# Patient Record
Sex: FEMALE | Race: WHITE | NOT HISPANIC OR LATINO
[De-identification: names, ages, dates, MRNs, and addresses within clinical notes are randomized per-mention and may not be internally consistent; named-entity substitution may affect disease eponyms.]

---

## 2017-01-28 ENCOUNTER — APPOINTMENT (OUTPATIENT)
Dept: FAMILY MEDICINE | Facility: CLINIC | Age: 62
End: 2017-01-28

## 2017-01-28 VITALS
WEIGHT: 195 LBS | TEMPERATURE: 99.8 F | SYSTOLIC BLOOD PRESSURE: 120 MMHG | DIASTOLIC BLOOD PRESSURE: 80 MMHG | BODY MASS INDEX: 31.34 KG/M2 | HEIGHT: 66 IN

## 2017-01-28 VITALS — DIASTOLIC BLOOD PRESSURE: 80 MMHG | HEART RATE: 82 BPM | RESPIRATION RATE: 16 BRPM | SYSTOLIC BLOOD PRESSURE: 120 MMHG

## 2017-03-01 ENCOUNTER — RX RENEWAL (OUTPATIENT)
Age: 62
End: 2017-03-01

## 2017-03-16 ENCOUNTER — RX RENEWAL (OUTPATIENT)
Age: 62
End: 2017-03-16

## 2017-04-03 ENCOUNTER — APPOINTMENT (OUTPATIENT)
Dept: FAMILY MEDICINE | Facility: CLINIC | Age: 62
End: 2017-04-03

## 2017-04-03 VITALS — SYSTOLIC BLOOD PRESSURE: 120 MMHG | DIASTOLIC BLOOD PRESSURE: 90 MMHG | HEART RATE: 72 BPM | RESPIRATION RATE: 16 BRPM

## 2017-04-03 VITALS
WEIGHT: 193.31 LBS | BODY MASS INDEX: 31.07 KG/M2 | OXYGEN SATURATION: 97 % | HEART RATE: 76 BPM | HEIGHT: 66 IN | SYSTOLIC BLOOD PRESSURE: 120 MMHG | DIASTOLIC BLOOD PRESSURE: 84 MMHG

## 2017-04-03 RX ORDER — SUCRALFATE 1 G/10ML
1 SUSPENSION ORAL
Qty: 900 | Refills: 0 | Status: COMPLETED | COMMUNITY
Start: 2016-10-18 | End: 2017-04-03

## 2017-04-03 RX ORDER — OSELTAMIVIR PHOSPHATE 75 MG/1
75 CAPSULE ORAL TWICE DAILY
Qty: 10 | Refills: 0 | Status: COMPLETED | COMMUNITY
Start: 2017-01-28 | End: 2017-04-03

## 2017-04-04 ENCOUNTER — TRANSCRIPTION ENCOUNTER (OUTPATIENT)
Age: 62
End: 2017-04-04

## 2017-06-01 ENCOUNTER — RX RENEWAL (OUTPATIENT)
Age: 62
End: 2017-06-01

## 2017-06-10 ENCOUNTER — OUTPATIENT (OUTPATIENT)
Dept: OUTPATIENT SERVICES | Facility: HOSPITAL | Age: 62
LOS: 1 days | End: 2017-06-10
Payer: COMMERCIAL

## 2017-06-10 DIAGNOSIS — G47.33 OBSTRUCTIVE SLEEP APNEA (ADULT) (PEDIATRIC): ICD-10-CM

## 2017-06-10 PROCEDURE — 95810 POLYSOM 6/> YRS 4/> PARAM: CPT | Mod: 26

## 2017-06-10 PROCEDURE — 95810 POLYSOM 6/> YRS 4/> PARAM: CPT

## 2017-06-22 ENCOUNTER — RX RENEWAL (OUTPATIENT)
Age: 62
End: 2017-06-22

## 2017-06-26 ENCOUNTER — APPOINTMENT (OUTPATIENT)
Dept: FAMILY MEDICINE | Facility: CLINIC | Age: 62
End: 2017-06-26

## 2017-06-26 ENCOUNTER — LABORATORY RESULT (OUTPATIENT)
Age: 62
End: 2017-06-26

## 2017-06-26 VITALS — SYSTOLIC BLOOD PRESSURE: 120 MMHG | RESPIRATION RATE: 16 BRPM | DIASTOLIC BLOOD PRESSURE: 76 MMHG | HEART RATE: 72 BPM

## 2017-06-26 VITALS
BODY MASS INDEX: 31.82 KG/M2 | OXYGEN SATURATION: 97 % | RESPIRATION RATE: 16 BRPM | SYSTOLIC BLOOD PRESSURE: 110 MMHG | HEART RATE: 75 BPM | WEIGHT: 198 LBS | DIASTOLIC BLOOD PRESSURE: 70 MMHG | HEIGHT: 66 IN

## 2017-06-26 RX ORDER — MOMETASONE 50 UG/1
50 SPRAY, METERED NASAL DAILY
Qty: 3 | Refills: 3 | Status: COMPLETED | COMMUNITY
Start: 2017-04-03 | End: 2017-06-26

## 2017-06-26 RX ORDER — CETIRIZINE HYDROCHLORIDE 10 MG/1
10 TABLET, FILM COATED ORAL
Qty: 90 | Refills: 3 | Status: COMPLETED | COMMUNITY
Start: 2017-04-03 | End: 2017-06-26

## 2017-06-27 LAB
B BURGDOR IGG+IGM SER QL IB: NORMAL
BASOPHILS # BLD AUTO: 0.04 K/UL
BASOPHILS NFR BLD AUTO: 0.7 %
C3 SERPL-MCNC: 140 MG/DL
CCP AB SER IA-ACNC: <8 UNITS
EOSINOPHIL # BLD AUTO: 0.11 K/UL
EOSINOPHIL NFR BLD AUTO: 1.9 %
ERYTHROCYTE [SEDIMENTATION RATE] IN BLOOD BY WESTERGREN METHOD: 9 MM/HR
HCT VFR BLD CALC: 43.9 %
HGB BLD-MCNC: 14 G/DL
IMM GRANULOCYTES NFR BLD AUTO: 0.4 %
LYMPHOCYTES # BLD AUTO: 1.48 K/UL
LYMPHOCYTES NFR BLD AUTO: 26.1 %
MAN DIFF?: NORMAL
MCHC RBC-ENTMCNC: 30.4 PG
MCHC RBC-ENTMCNC: 31.9 GM/DL
MCV RBC AUTO: 95.4 FL
MONOCYTES # BLD AUTO: 0.5 K/UL
MONOCYTES NFR BLD AUTO: 8.8 %
NEUTROPHILS # BLD AUTO: 3.52 K/UL
NEUTROPHILS NFR BLD AUTO: 62.1 %
PLATELET # BLD AUTO: 263 K/UL
RBC # BLD: 4.6 M/UL
RBC # FLD: 13.3 %
RF+CCP IGG SER-IMP: NEGATIVE
RHEUMATOID FACT SER QL: <7 IU/ML
WBC # FLD AUTO: 5.67 K/UL

## 2017-06-28 LAB
ANA PAT FLD IF-IMP: ABNORMAL
ANA SER IF-ACNC: ABNORMAL

## 2017-07-06 ENCOUNTER — RX RENEWAL (OUTPATIENT)
Age: 62
End: 2017-07-06

## 2017-07-11 ENCOUNTER — TRANSCRIPTION ENCOUNTER (OUTPATIENT)
Age: 62
End: 2017-07-11

## 2017-07-11 LAB — HEMOCCULT STL QL IA: NEGATIVE

## 2017-07-19 ENCOUNTER — APPOINTMENT (OUTPATIENT)
Dept: PULMONOLOGY | Facility: CLINIC | Age: 62
End: 2017-07-19

## 2017-07-19 VITALS
SYSTOLIC BLOOD PRESSURE: 110 MMHG | WEIGHT: 197 LBS | DIASTOLIC BLOOD PRESSURE: 70 MMHG | OXYGEN SATURATION: 96 % | BODY MASS INDEX: 31.8 KG/M2 | HEART RATE: 92 BPM

## 2017-07-19 RX ORDER — DEXLANSOPRAZOLE 60 MG/1
60 CAPSULE, DELAYED RELEASE ORAL DAILY
Refills: 0 | Status: DISCONTINUED | COMMUNITY
End: 2017-07-19

## 2017-09-05 ENCOUNTER — FORM ENCOUNTER (OUTPATIENT)
Age: 62
End: 2017-09-05

## 2017-09-05 ENCOUNTER — APPOINTMENT (OUTPATIENT)
Dept: FAMILY MEDICINE | Facility: CLINIC | Age: 62
End: 2017-09-05
Payer: COMMERCIAL

## 2017-09-05 VITALS
WEIGHT: 193 LBS | SYSTOLIC BLOOD PRESSURE: 132 MMHG | DIASTOLIC BLOOD PRESSURE: 86 MMHG | BODY MASS INDEX: 31.02 KG/M2 | HEIGHT: 66 IN

## 2017-09-05 DIAGNOSIS — S49.90XA UNSPECIFIED INJURY OF SHOULDER AND UPPER ARM, UNSPECIFIED ARM, INITIAL ENCOUNTER: ICD-10-CM

## 2017-09-05 PROCEDURE — 99213 OFFICE O/P EST LOW 20 MIN: CPT

## 2017-09-06 ENCOUNTER — OUTPATIENT (OUTPATIENT)
Dept: OUTPATIENT SERVICES | Facility: HOSPITAL | Age: 62
LOS: 1 days | End: 2017-09-06
Payer: COMMERCIAL

## 2017-09-06 ENCOUNTER — APPOINTMENT (OUTPATIENT)
Dept: MRI IMAGING | Facility: CLINIC | Age: 62
End: 2017-09-06
Payer: COMMERCIAL

## 2017-09-06 DIAGNOSIS — S49.90XA UNSPECIFIED INJURY OF SHOULDER AND UPPER ARM, UNSPECIFIED ARM, INITIAL ENCOUNTER: ICD-10-CM

## 2017-09-06 DIAGNOSIS — Z00.8 ENCOUNTER FOR OTHER GENERAL EXAMINATION: ICD-10-CM

## 2017-09-06 PROCEDURE — 73221 MRI JOINT UPR EXTREM W/O DYE: CPT

## 2017-09-06 PROCEDURE — 73221 MRI JOINT UPR EXTREM W/O DYE: CPT | Mod: 26,LT

## 2017-09-11 ENCOUNTER — MOBILE ON CALL (OUTPATIENT)
Age: 62
End: 2017-09-11

## 2017-09-13 ENCOUNTER — APPOINTMENT (OUTPATIENT)
Dept: PULMONOLOGY | Facility: CLINIC | Age: 62
End: 2017-09-13

## 2017-10-09 ENCOUNTER — APPOINTMENT (OUTPATIENT)
Dept: PULMONOLOGY | Facility: CLINIC | Age: 62
End: 2017-10-09

## 2017-10-09 VITALS
DIASTOLIC BLOOD PRESSURE: 68 MMHG | HEART RATE: 88 BPM | OXYGEN SATURATION: 95 % | BODY MASS INDEX: 31.96 KG/M2 | SYSTOLIC BLOOD PRESSURE: 100 MMHG | WEIGHT: 198 LBS | RESPIRATION RATE: 16 BRPM

## 2017-11-17 ENCOUNTER — APPOINTMENT (OUTPATIENT)
Dept: FAMILY MEDICINE | Facility: CLINIC | Age: 62
End: 2017-11-17
Payer: OTHER MISCELLANEOUS

## 2017-11-17 VITALS
RESPIRATION RATE: 16 BRPM | WEIGHT: 198 LBS | HEIGHT: 66 IN | OXYGEN SATURATION: 98 % | DIASTOLIC BLOOD PRESSURE: 68 MMHG | SYSTOLIC BLOOD PRESSURE: 110 MMHG | HEART RATE: 77 BPM | BODY MASS INDEX: 31.82 KG/M2

## 2017-11-17 PROCEDURE — 99203 OFFICE O/P NEW LOW 30 MIN: CPT

## 2017-11-17 RX ORDER — BENZONATATE 100 MG/1
100 CAPSULE ORAL
Qty: 30 | Refills: 0 | Status: DISCONTINUED | COMMUNITY
Start: 2017-10-26

## 2017-11-17 RX ORDER — FAMOTIDINE 20 MG/1
20 TABLET, FILM COATED ORAL
Refills: 0 | Status: DISCONTINUED | COMMUNITY
End: 2017-11-17

## 2017-11-17 RX ORDER — DOXYCYCLINE 100 MG/1
100 CAPSULE ORAL
Qty: 20 | Refills: 0 | Status: DISCONTINUED | COMMUNITY
Start: 2017-10-26

## 2017-11-17 RX ORDER — AZELASTINE HYDROCHLORIDE 137 UG/1
0.1 SPRAY, METERED NASAL
Qty: 30 | Refills: 0 | Status: DISCONTINUED | COMMUNITY
Start: 2017-10-26

## 2017-12-08 ENCOUNTER — APPOINTMENT (OUTPATIENT)
Dept: PULMONOLOGY | Facility: CLINIC | Age: 62
End: 2017-12-08

## 2017-12-22 ENCOUNTER — RESULT REVIEW (OUTPATIENT)
Age: 62
End: 2017-12-22

## 2017-12-31 ENCOUNTER — RX RENEWAL (OUTPATIENT)
Age: 62
End: 2017-12-31

## 2018-02-13 ENCOUNTER — RESULT CHARGE (OUTPATIENT)
Age: 63
End: 2018-02-13

## 2018-02-13 ENCOUNTER — APPOINTMENT (OUTPATIENT)
Dept: FAMILY MEDICINE | Facility: CLINIC | Age: 63
End: 2018-02-13
Payer: COMMERCIAL

## 2018-02-13 VITALS
SYSTOLIC BLOOD PRESSURE: 124 MMHG | BODY MASS INDEX: 31.82 KG/M2 | OXYGEN SATURATION: 98 % | HEIGHT: 66 IN | WEIGHT: 198 LBS | TEMPERATURE: 99.2 F | HEART RATE: 72 BPM | DIASTOLIC BLOOD PRESSURE: 70 MMHG | RESPIRATION RATE: 16 BRPM

## 2018-02-13 LAB — S PYO AG SPEC QL IA: NEGATIVE

## 2018-02-13 PROCEDURE — 99214 OFFICE O/P EST MOD 30 MIN: CPT | Mod: 25

## 2018-02-13 PROCEDURE — 87880 STREP A ASSAY W/OPTIC: CPT | Mod: QW

## 2018-03-07 ENCOUNTER — APPOINTMENT (OUTPATIENT)
Dept: PULMONOLOGY | Facility: CLINIC | Age: 63
End: 2018-03-07

## 2018-03-09 ENCOUNTER — APPOINTMENT (OUTPATIENT)
Dept: PULMONOLOGY | Facility: CLINIC | Age: 63
End: 2018-03-09

## 2018-06-06 ENCOUNTER — RX RENEWAL (OUTPATIENT)
Age: 63
End: 2018-06-06

## 2018-06-12 ENCOUNTER — APPOINTMENT (OUTPATIENT)
Dept: PULMONOLOGY | Facility: CLINIC | Age: 63
End: 2018-06-12
Payer: COMMERCIAL

## 2018-06-12 ENCOUNTER — RX RENEWAL (OUTPATIENT)
Age: 63
End: 2018-06-12

## 2018-06-12 VITALS
OXYGEN SATURATION: 94 % | HEART RATE: 72 BPM | WEIGHT: 191 LBS | BODY MASS INDEX: 30.7 KG/M2 | SYSTOLIC BLOOD PRESSURE: 112 MMHG | DIASTOLIC BLOOD PRESSURE: 60 MMHG | HEIGHT: 66 IN

## 2018-06-12 PROCEDURE — 99214 OFFICE O/P EST MOD 30 MIN: CPT

## 2018-06-12 RX ORDER — OSELTAMIVIR PHOSPHATE 75 MG/1
75 CAPSULE ORAL TWICE DAILY
Qty: 10 | Refills: 0 | Status: DISCONTINUED | COMMUNITY
Start: 2018-02-13 | End: 2018-06-12

## 2018-06-12 RX ORDER — NAPROXEN 500 MG/1
500 TABLET ORAL
Qty: 60 | Refills: 0 | Status: DISCONTINUED | COMMUNITY
Start: 2017-09-05 | End: 2018-06-12

## 2018-06-12 RX ORDER — OXYCODONE AND ACETAMINOPHEN 5; 325 MG/1; MG/1
5-325 TABLET ORAL
Qty: 30 | Refills: 0 | Status: DISCONTINUED | COMMUNITY
Start: 2017-09-05 | End: 2018-06-12

## 2018-06-12 RX ORDER — AZITHROMYCIN 250 MG/1
250 TABLET, FILM COATED ORAL
Qty: 1 | Refills: 0 | Status: DISCONTINUED | COMMUNITY
Start: 2018-02-13 | End: 2018-06-12

## 2018-07-06 ENCOUNTER — RX RENEWAL (OUTPATIENT)
Age: 63
End: 2018-07-06

## 2018-07-13 ENCOUNTER — RX RENEWAL (OUTPATIENT)
Age: 63
End: 2018-07-13

## 2018-08-01 ENCOUNTER — APPOINTMENT (OUTPATIENT)
Dept: FAMILY MEDICINE | Facility: CLINIC | Age: 63
End: 2018-08-01
Payer: COMMERCIAL

## 2018-08-01 VITALS
HEIGHT: 66 IN | SYSTOLIC BLOOD PRESSURE: 110 MMHG | OXYGEN SATURATION: 97 % | DIASTOLIC BLOOD PRESSURE: 72 MMHG | HEART RATE: 79 BPM | BODY MASS INDEX: 30.22 KG/M2 | RESPIRATION RATE: 16 BRPM | TEMPERATURE: 98.3 F | WEIGHT: 188 LBS

## 2018-08-01 VITALS — DIASTOLIC BLOOD PRESSURE: 76 MMHG | RESPIRATION RATE: 16 BRPM | SYSTOLIC BLOOD PRESSURE: 120 MMHG | HEART RATE: 76 BPM

## 2018-08-01 PROCEDURE — 99214 OFFICE O/P EST MOD 30 MIN: CPT | Mod: 25

## 2018-08-01 PROCEDURE — 36415 COLL VENOUS BLD VENIPUNCTURE: CPT

## 2018-08-01 RX ORDER — METHOCARBAMOL 750 MG/1
750 TABLET, FILM COATED ORAL
Refills: 0 | Status: COMPLETED | COMMUNITY
End: 2018-08-01

## 2018-08-01 NOTE — HISTORY OF PRESENT ILLNESS
[FreeTextEntry8] : lump in throat for several days  no temp no  dysphagia no cough nasal congestion seeing  endo for  dm ans  hypothyroid diabetes l doing  well aic  5.6

## 2018-08-01 NOTE — REVIEW OF SYSTEMS
[Wheezing] : wheezing [Diarrhea] : diarrhea [Fever] : no fever [Sore Throat] : no sore throat [Chest Pain] : no chest pain [Shortness Of Breath] : no shortness of breath [Cough] : no cough [Nausea] : no nausea [Constipation] : no constipation [Heartburn] : no heartburn [Dizziness] : no dizziness [de-identified] : mood  good

## 2018-08-01 NOTE — PHYSICAL EXAM
[No Acute Distress] : no acute distress [Normal Outer Ear/Nose] : the outer ears and nose were normal in appearance [Normal Oropharynx] : the oropharynx was normal [Normal Nasal Mucosa] : the nasal mucosa was normal [No JVD] : no jugular venous distention [Supple] : supple [Thyroid Normal, No Nodules] : the thyroid was normal and there were no nodules present [Clear to Auscultation] : lungs were clear to auscultation bilaterally [Regular Rhythm] : with a regular rhythm [No Murmur] : no murmur heard [No Edema] : there was no peripheral edema [Soft] : abdomen soft [Non Tender] : non-tender [No HSM] : no HSM [No Rash] : no rash [Normal Gait] : normal gait [de-identified] : indirect layngoscopy  neg

## 2018-08-02 ENCOUNTER — RX RENEWAL (OUTPATIENT)
Age: 63
End: 2018-08-02

## 2018-08-03 LAB
ALBUMIN SERPL ELPH-MCNC: 4.6 G/DL
ALP BLD-CCNC: 69 U/L
ALT SERPL-CCNC: 17 U/L
ANION GAP SERPL CALC-SCNC: 14 MMOL/L
APPEARANCE: CLEAR
AST SERPL-CCNC: 21 U/L
BACTERIA: NEGATIVE
BASOPHILS # BLD AUTO: 0.03 K/UL
BASOPHILS NFR BLD AUTO: 0.5 %
BILIRUB SERPL-MCNC: 1.4 MG/DL
BILIRUBIN URINE: NEGATIVE
BLOOD URINE: NEGATIVE
BUN SERPL-MCNC: 15 MG/DL
CALCIUM SERPL-MCNC: 9.7 MG/DL
CHLORIDE SERPL-SCNC: 103 MMOL/L
CHOLEST SERPL-MCNC: 174 MG/DL
CHOLEST/HDLC SERPL: 2.9 RATIO
CO2 SERPL-SCNC: 27 MMOL/L
COLOR: YELLOW
CREAT SERPL-MCNC: 0.94 MG/DL
CREAT SPEC-SCNC: 67 MG/DL
EOSINOPHIL # BLD AUTO: 0.09 K/UL
EOSINOPHIL NFR BLD AUTO: 1.4 %
GLUCOSE QUALITATIVE U: NEGATIVE MG/DL
GLUCOSE SERPL-MCNC: 91 MG/DL
HBA1C MFR BLD HPLC: 5.8 %
HCT VFR BLD CALC: 45.4 %
HDLC SERPL-MCNC: 59 MG/DL
HGB BLD-MCNC: 14.4 G/DL
HYALINE CASTS: 3 /LPF
IMM GRANULOCYTES NFR BLD AUTO: 0.2 %
KETONES URINE: NEGATIVE
LDLC SERPL CALC-MCNC: 92 MG/DL
LEUKOCYTE ESTERASE URINE: NEGATIVE
LYMPHOCYTES # BLD AUTO: 1.26 K/UL
LYMPHOCYTES NFR BLD AUTO: 18.9 %
MAN DIFF?: NORMAL
MCHC RBC-ENTMCNC: 30.6 PG
MCHC RBC-ENTMCNC: 31.7 GM/DL
MCV RBC AUTO: 96.6 FL
MICROALBUMIN 24H UR DL<=1MG/L-MCNC: <1.2 MG/DL
MICROALBUMIN/CREAT 24H UR-RTO: NORMAL
MICROSCOPIC-UA: NORMAL
MONOCYTES # BLD AUTO: 0.57 K/UL
MONOCYTES NFR BLD AUTO: 8.6 %
NEUTROPHILS # BLD AUTO: 4.7 K/UL
NEUTROPHILS NFR BLD AUTO: 70.4 %
NITRITE URINE: NEGATIVE
PH URINE: 7
PLATELET # BLD AUTO: 258 K/UL
POTASSIUM SERPL-SCNC: 4.3 MMOL/L
PROT SERPL-MCNC: 7.1 G/DL
PROTEIN URINE: NEGATIVE MG/DL
RBC # BLD: 4.7 M/UL
RBC # FLD: 14.5 %
RED BLOOD CELLS URINE: 2 /HPF
SODIUM SERPL-SCNC: 144 MMOL/L
SPECIFIC GRAVITY URINE: 1.01
SQUAMOUS EPITHELIAL CELLS: 2 /HPF
T4 SERPL-MCNC: 9.1 UG/DL
TRIGL SERPL-MCNC: 116 MG/DL
TSH SERPL-ACNC: 1.5 UIU/ML
URATE SERPL-MCNC: 5.4 MG/DL
UROBILINOGEN URINE: NEGATIVE MG/DL
WBC # FLD AUTO: 6.66 K/UL
WHITE BLOOD CELLS URINE: 1 /HPF

## 2018-09-03 ENCOUNTER — RX RENEWAL (OUTPATIENT)
Age: 63
End: 2018-09-03

## 2018-09-04 ENCOUNTER — RX RENEWAL (OUTPATIENT)
Age: 63
End: 2018-09-04

## 2018-09-04 DIAGNOSIS — H81.10 BENIGN PAROXYSMAL VERTIGO, UNSPECIFIED EAR: ICD-10-CM

## 2018-09-05 PROBLEM — H81.10 VERTIGO, BENIGN POSITIONAL: Status: ACTIVE | Noted: 2018-09-05

## 2018-09-07 ENCOUNTER — RX RENEWAL (OUTPATIENT)
Age: 63
End: 2018-09-07

## 2018-09-10 ENCOUNTER — RX RENEWAL (OUTPATIENT)
Age: 63
End: 2018-09-10

## 2018-09-20 ENCOUNTER — RX RENEWAL (OUTPATIENT)
Age: 63
End: 2018-09-20

## 2018-10-11 ENCOUNTER — RX RENEWAL (OUTPATIENT)
Age: 63
End: 2018-10-11

## 2018-11-21 ENCOUNTER — NON-APPOINTMENT (OUTPATIENT)
Age: 63
End: 2018-11-21

## 2018-11-21 ENCOUNTER — APPOINTMENT (OUTPATIENT)
Dept: FAMILY MEDICINE | Facility: CLINIC | Age: 63
End: 2018-11-21
Payer: COMMERCIAL

## 2018-11-21 VITALS — SYSTOLIC BLOOD PRESSURE: 110 MMHG | HEART RATE: 72 BPM | RESPIRATION RATE: 16 BRPM | DIASTOLIC BLOOD PRESSURE: 90 MMHG

## 2018-11-21 VITALS
HEIGHT: 66 IN | OXYGEN SATURATION: 98 % | SYSTOLIC BLOOD PRESSURE: 126 MMHG | HEART RATE: 76 BPM | WEIGHT: 190 LBS | BODY MASS INDEX: 30.53 KG/M2 | DIASTOLIC BLOOD PRESSURE: 72 MMHG

## 2018-11-21 PROCEDURE — 93000 ELECTROCARDIOGRAM COMPLETE: CPT

## 2018-11-21 PROCEDURE — 99396 PREV VISIT EST AGE 40-64: CPT | Mod: 25

## 2018-11-21 RX ORDER — FEXOFENADINE HCL 180 MG/1
180 TABLET ORAL DAILY
Qty: 90 | Refills: 0 | Status: DISCONTINUED | COMMUNITY
Start: 2017-06-26 | End: 2018-11-21

## 2018-11-21 RX ORDER — MOMETASONE 50 UG/1
50 SPRAY, METERED NASAL DAILY
Qty: 1 | Refills: 3 | Status: DISCONTINUED | COMMUNITY
Start: 2017-01-28 | End: 2018-11-21

## 2018-11-21 RX ORDER — ONDANSETRON 4 MG/1
4 TABLET ORAL EVERY 6 HOURS
Qty: 10 | Refills: 0 | Status: DISCONTINUED | COMMUNITY
Start: 2018-09-05 | End: 2018-11-21

## 2018-11-21 RX ORDER — HYDROCORTISONE ACETATE AND PRAMOXINE HYDROCHLORIDE 25; 10 MG/G; MG/G
2.5-1 CREAM TOPICAL TWICE DAILY
Qty: 1 | Refills: 3 | Status: DISCONTINUED | COMMUNITY
Start: 2017-04-03 | End: 2018-11-21

## 2018-11-21 RX ORDER — TRIAMCINOLONE ACETONIDE 55 UG/1
55 SOLUTION/ DROPS OPHTHALMIC
Qty: 1 | Refills: 6 | Status: DISCONTINUED | COMMUNITY
Start: 2018-08-01 | End: 2018-11-21

## 2018-11-21 RX ORDER — VITAMIN E ACETATE 100/0.25ML
DROPS ORAL
Refills: 0 | Status: DISCONTINUED | COMMUNITY
End: 2018-11-21

## 2018-11-21 NOTE — HEALTH RISK ASSESSMENT
[Very Good] : ~his/her~  mood as very good [No falls in past year] : Patient reported no falls in the past year [0] : 2) Feeling down, depressed, or hopeless: Not at all (0) [Patient reported mammogram was normal] : Patient reported mammogram was normal [Patient reported PAP Smear was normal] : Patient reported PAP Smear was normal [Patient reported bone density results were normal] : Patient reported bone density results were normal [Patient reported colonoscopy was normal] : Patient reported colonoscopy was normal [None] : None [With Family] : lives with family [Employed] : employed [] :  [Smoke Detector] : smoke detector [Carbon Monoxide Detector] : carbon monoxide detector [Seat Belt] :  uses seat belt [Discussed at today's visit] : Advance Directives Discussed at today's visit [] : No [de-identified] : occ  [Change in mental status noted] : No change in mental status noted [Reports changes in hearing] : Reports no changes in hearing [Reports changes in vision] : Reports no changes in vision [Reports changes in dental health] : Reports no changes in dental health [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

## 2018-11-21 NOTE — REVIEW OF SYSTEMS
[Fatigue] : fatigue [Itching] : itching [Nasal Discharge] : nasal discharge [Nausea] : nausea [Nocturia] : nocturia [Negative] : Heme/Lymph

## 2018-11-21 NOTE — HISTORY OF PRESENT ILLNESS
[FreeTextEntry1] : pt  is a 63 year old female here for cpe and managemnt of dm htn hld hypothyroid  [de-identified] : feeling well diabetes  doing well

## 2018-11-25 ENCOUNTER — RX RENEWAL (OUTPATIENT)
Age: 63
End: 2018-11-25

## 2018-11-25 LAB
ALBUMIN SERPL ELPH-MCNC: 4.6 G/DL
ALP BLD-CCNC: 71 U/L
ALT SERPL-CCNC: 22 U/L
ANION GAP SERPL CALC-SCNC: 13 MMOL/L
APPEARANCE: CLEAR
AST SERPL-CCNC: 26 U/L
BACTERIA: ABNORMAL
BASOPHILS # BLD AUTO: 0.03 K/UL
BASOPHILS NFR BLD AUTO: 0.5 %
BILIRUB SERPL-MCNC: 0.8 MG/DL
BILIRUBIN URINE: NEGATIVE
BLOOD URINE: NEGATIVE
BUN SERPL-MCNC: 14 MG/DL
CALCIUM SERPL-MCNC: 9.9 MG/DL
CHLORIDE SERPL-SCNC: 102 MMOL/L
CHOLEST SERPL-MCNC: 203 MG/DL
CHOLEST/HDLC SERPL: 2.7 RATIO
CO2 SERPL-SCNC: 28 MMOL/L
COLOR: YELLOW
CREAT SERPL-MCNC: 0.88 MG/DL
CREAT SPEC-SCNC: 106 MG/DL
EOSINOPHIL # BLD AUTO: 0.09 K/UL
EOSINOPHIL NFR BLD AUTO: 1.6 %
GLUCOSE QUALITATIVE U: NEGATIVE MG/DL
GLUCOSE SERPL-MCNC: 87 MG/DL
HBA1C MFR BLD HPLC: 5.9 %
HCT VFR BLD CALC: 43.6 %
HDLC SERPL-MCNC: 74 MG/DL
HGB BLD-MCNC: 14.2 G/DL
HYALINE CASTS: 8 /LPF
IMM GRANULOCYTES NFR BLD AUTO: 0.2 %
KETONES URINE: NEGATIVE
LDLC SERPL CALC-MCNC: 111 MG/DL
LEUKOCYTE ESTERASE URINE: NEGATIVE
LYMPHOCYTES # BLD AUTO: 1.76 K/UL
LYMPHOCYTES NFR BLD AUTO: 31.5 %
MAN DIFF?: NORMAL
MCHC RBC-ENTMCNC: 30.7 PG
MCHC RBC-ENTMCNC: 32.6 GM/DL
MCV RBC AUTO: 94.2 FL
MICROALBUMIN 24H UR DL<=1MG/L-MCNC: <1.2 MG/DL
MICROALBUMIN/CREAT 24H UR-RTO: NORMAL
MICROSCOPIC-UA: NORMAL
MONOCYTES # BLD AUTO: 0.53 K/UL
MONOCYTES NFR BLD AUTO: 9.5 %
NEUTROPHILS # BLD AUTO: 3.16 K/UL
NEUTROPHILS NFR BLD AUTO: 56.7 %
NITRITE URINE: NEGATIVE
PH URINE: 7.5
PLATELET # BLD AUTO: 275 K/UL
POTASSIUM SERPL-SCNC: 4.7 MMOL/L
PROT SERPL-MCNC: 7.6 G/DL
PROTEIN URINE: NEGATIVE MG/DL
RBC # BLD: 4.63 M/UL
RBC # FLD: 13 %
RED BLOOD CELLS URINE: 2 /HPF
SODIUM SERPL-SCNC: 143 MMOL/L
SPECIFIC GRAVITY URINE: 1.02
SQUAMOUS EPITHELIAL CELLS: 14 /HPF
T4 SERPL-MCNC: 8.1 UG/DL
TRIGL SERPL-MCNC: 90 MG/DL
TSH SERPL-ACNC: 1.18 UIU/ML
URATE SERPL-MCNC: 6.1 MG/DL
UROBILINOGEN URINE: NEGATIVE MG/DL
WBC # FLD AUTO: 5.58 K/UL
WHITE BLOOD CELLS URINE: 2 /HPF

## 2018-12-06 ENCOUNTER — CHART COPY (OUTPATIENT)
Age: 63
End: 2018-12-06

## 2018-12-06 ENCOUNTER — APPOINTMENT (OUTPATIENT)
Dept: PULMONOLOGY | Facility: CLINIC | Age: 63
End: 2018-12-06
Payer: COMMERCIAL

## 2018-12-06 VITALS
BODY MASS INDEX: 30.7 KG/M2 | DIASTOLIC BLOOD PRESSURE: 60 MMHG | WEIGHT: 191 LBS | OXYGEN SATURATION: 94 % | HEIGHT: 66 IN | HEART RATE: 74 BPM | SYSTOLIC BLOOD PRESSURE: 110 MMHG

## 2018-12-06 PROCEDURE — 99214 OFFICE O/P EST MOD 30 MIN: CPT

## 2018-12-17 ENCOUNTER — RX RENEWAL (OUTPATIENT)
Age: 63
End: 2018-12-17

## 2018-12-18 LAB — HEMOCCULT STL QL IA: NEGATIVE

## 2019-01-10 ENCOUNTER — RX RENEWAL (OUTPATIENT)
Age: 64
End: 2019-01-10

## 2019-01-29 ENCOUNTER — FORM ENCOUNTER (OUTPATIENT)
Age: 64
End: 2019-01-29

## 2019-01-30 ENCOUNTER — OUTPATIENT (OUTPATIENT)
Dept: OUTPATIENT SERVICES | Facility: HOSPITAL | Age: 64
LOS: 1 days | End: 2019-01-30
Payer: COMMERCIAL

## 2019-01-30 ENCOUNTER — APPOINTMENT (OUTPATIENT)
Dept: RADIOLOGY | Facility: CLINIC | Age: 64
End: 2019-01-30
Payer: COMMERCIAL

## 2019-01-30 DIAGNOSIS — I10 ESSENTIAL (PRIMARY) HYPERTENSION: ICD-10-CM

## 2019-01-30 DIAGNOSIS — Z00.8 ENCOUNTER FOR OTHER GENERAL EXAMINATION: ICD-10-CM

## 2019-01-30 DIAGNOSIS — E11.9 TYPE 2 DIABETES MELLITUS WITHOUT COMPLICATIONS: ICD-10-CM

## 2019-01-30 DIAGNOSIS — E78.5 HYPERLIPIDEMIA, UNSPECIFIED: ICD-10-CM

## 2019-01-30 DIAGNOSIS — E03.9 HYPOTHYROIDISM, UNSPECIFIED: ICD-10-CM

## 2019-01-30 PROCEDURE — 77080 DXA BONE DENSITY AXIAL: CPT | Mod: 26

## 2019-01-30 PROCEDURE — 77080 DXA BONE DENSITY AXIAL: CPT

## 2019-01-31 ENCOUNTER — TRANSCRIPTION ENCOUNTER (OUTPATIENT)
Age: 64
End: 2019-01-31

## 2019-03-06 ENCOUNTER — TRANSCRIPTION ENCOUNTER (OUTPATIENT)
Age: 64
End: 2019-03-06

## 2019-03-17 ENCOUNTER — RX RENEWAL (OUTPATIENT)
Age: 64
End: 2019-03-17

## 2019-04-01 ENCOUNTER — RX RENEWAL (OUTPATIENT)
Age: 64
End: 2019-04-01

## 2019-04-07 ENCOUNTER — RX RENEWAL (OUTPATIENT)
Age: 64
End: 2019-04-07

## 2019-05-31 ENCOUNTER — RX RENEWAL (OUTPATIENT)
Age: 64
End: 2019-05-31

## 2019-06-12 ENCOUNTER — RX RENEWAL (OUTPATIENT)
Age: 64
End: 2019-06-12

## 2019-07-01 ENCOUNTER — RX RENEWAL (OUTPATIENT)
Age: 64
End: 2019-07-01

## 2019-07-08 ENCOUNTER — APPOINTMENT (OUTPATIENT)
Dept: PULMONOLOGY | Facility: CLINIC | Age: 64
End: 2019-07-08
Payer: COMMERCIAL

## 2019-07-08 VITALS
SYSTOLIC BLOOD PRESSURE: 112 MMHG | HEART RATE: 80 BPM | OXYGEN SATURATION: 94 % | BODY MASS INDEX: 30.67 KG/M2 | DIASTOLIC BLOOD PRESSURE: 60 MMHG | WEIGHT: 190 LBS

## 2019-07-08 PROCEDURE — 99214 OFFICE O/P EST MOD 30 MIN: CPT

## 2019-07-08 RX ORDER — LANCETS
EACH MISCELLANEOUS
Qty: 90 | Refills: 1 | Status: DISCONTINUED | COMMUNITY
Start: 2017-07-06 | End: 2019-07-08

## 2019-07-08 RX ORDER — BLOOD-GLUCOSE METER
W/DEVICE EACH MISCELLANEOUS
Qty: 1 | Refills: 0 | Status: DISCONTINUED | COMMUNITY
Start: 2017-07-06 | End: 2019-07-08

## 2019-07-08 RX ORDER — BLOOD SUGAR DIAGNOSTIC
STRIP MISCELLANEOUS DAILY
Qty: 100 | Refills: 1 | Status: DISCONTINUED | COMMUNITY
Start: 2017-07-06 | End: 2019-07-08

## 2019-07-08 NOTE — PHYSICAL EXAM
[Normal Conjunctiva] : the conjunctiva exhibited no abnormalities [Elongated Uvula] : elongated uvula [Enlarged Base of the Tongue] : enlargement of the base of the tongue [Neck Appearance] : the appearance of the neck was normal [Jugular Venous Distention Increased] : there was no jugular-venous distention [Thyroid Diffuse Enlargement] : the thyroid was not enlarged [Heart Sounds] : normal S1 and S2 [Arterial Pulses Normal] : the arterial pulses were normal [Edema] : no peripheral edema present [Respiration, Rhythm And Depth] : normal respiratory rhythm and effort [Auscultation Breath Sounds / Voice Sounds] : lungs were clear to auscultation bilaterally [Lungs Percussion] : the lungs were normal to percussion [Bowel Sounds] : normal bowel sounds [Abdomen Soft] : soft [Abdomen Tenderness] : non-tender [Abnormal Walk] : normal gait [Nail Clubbing] : no clubbing of the fingernails [No Focal Deficits] : no focal deficits [Cyanosis, Localized] : no localized cyanosis [Oriented To Time, Place, And Person] : oriented to person, place, and time [Affect] : the affect was normal [Impaired Insight] : insight and judgment were intact [Memory Recent] : recent memory was not impaired [Skin Turgor] : normal skin turgor [] : no rash [FreeTextEntry1] : obese

## 2019-07-08 NOTE — HISTORY OF PRESENT ILLNESS
[Snoring] : snoring [Daytime Somnolence] : daytime somnolence [Frequent Nocturnal Awakening] : frequent nocturnal awakening [ESS: ___] : ESS score [unfilled] [Unintentional Sleep While Inactive] : unintentional sleep while inactive [Awakes Unrefreshed] : awakening unrefreshed [FreeTextEntry1] : 62 yo overweight female with H/O EDYTA and continued snoring, choking and gasping during sleep and non-restorative sleep.  Documented to have significant ongoing reflux despite fundal plication in the past.\par Diagnosed with EDYTA by Dr Causey in the past\par CPAP failure in the past\par \par 6/12/18\par Using cpap and doing well\par Straps uncomfortable in hair\par \par 12/6/18\par Doing well\par Strap issues persist\par Losing weight on Weight Watchers

## 2019-07-08 NOTE — ASSESSMENT
[FreeTextEntry1] : Moderate EDYTA\par Compliant with and benefiting from Nocturnal nasal cpap\par (Compliance underestimated due to travel unit she used when away)\par GERD

## 2019-09-16 ENCOUNTER — RX RENEWAL (OUTPATIENT)
Age: 64
End: 2019-09-16

## 2019-09-28 ENCOUNTER — RX RENEWAL (OUTPATIENT)
Age: 64
End: 2019-09-28

## 2019-10-04 ENCOUNTER — APPOINTMENT (OUTPATIENT)
Dept: FAMILY MEDICINE | Facility: CLINIC | Age: 64
End: 2019-10-04
Payer: COMMERCIAL

## 2019-10-04 VITALS
RESPIRATION RATE: 16 BRPM | HEIGHT: 66 IN | SYSTOLIC BLOOD PRESSURE: 100 MMHG | OXYGEN SATURATION: 94 % | HEART RATE: 76 BPM | DIASTOLIC BLOOD PRESSURE: 70 MMHG | BODY MASS INDEX: 30.53 KG/M2 | WEIGHT: 190 LBS

## 2019-10-04 DIAGNOSIS — H10.10 ACUTE ATOPIC CONJUNCTIVITIS, UNSPECIFIED EYE: ICD-10-CM

## 2019-10-04 DIAGNOSIS — Z87.09 PERSONAL HISTORY OF OTHER DISEASES OF THE RESPIRATORY SYSTEM: ICD-10-CM

## 2019-10-04 DIAGNOSIS — Z92.29 PERSONAL HISTORY OF OTHER DRUG THERAPY: ICD-10-CM

## 2019-10-04 DIAGNOSIS — M79.642 PAIN IN LEFT HAND: ICD-10-CM

## 2019-10-04 DIAGNOSIS — M67.449 GANGLION, UNSPECIFIED HAND: ICD-10-CM

## 2019-10-04 PROCEDURE — 99213 OFFICE O/P EST LOW 20 MIN: CPT

## 2019-10-04 NOTE — HISTORY OF PRESENT ILLNESS
[FreeTextEntry8] : Left middle finger painful growth for three weeks . Now increasing in size and is now hard and more painful. No trauma to finger. Also tender and pain down the side of the finger.

## 2019-10-04 NOTE — PHYSICAL EXAM
[Normal] : no acute distress, well nourished, well developed and well-appearing [No Joint Swelling] : no joint swelling [de-identified] : Small hard lump palmar mid left middle finger.

## 2019-10-09 ENCOUNTER — APPOINTMENT (OUTPATIENT)
Dept: ORTHOPEDIC SURGERY | Facility: CLINIC | Age: 64
End: 2019-10-09
Payer: COMMERCIAL

## 2019-10-09 PROCEDURE — 73140 X-RAY EXAM OF FINGER(S): CPT | Mod: F2

## 2019-10-09 PROCEDURE — 99204 OFFICE O/P NEW MOD 45 MIN: CPT

## 2019-10-09 NOTE — HISTORY OF PRESENT ILLNESS
[FreeTextEntry1] : the patient is a 63-year-old female who presents for evaluation of a left middle finger mass. She first noticed the mass approximately 3 weeks ago, she denies trauma or inciting event. Initially the mass was bluish and soft on the volar aspect of the digit, it has become firm and slightly tender to touch.She has a mild pain at the site of the mass it does not radiate. It is worse with activity and improved with rest.
impaired ability to control trunk for mobility/decreased ability to use arms for pushing/pulling/decreased ability to use legs for bridging/pushing

## 2019-10-09 NOTE — PHYSICAL EXAM
[Normal Finger/nose] : finger to nose coordination [Normal LUE] : Left Upper Extremity: No scars, rashes, lesions, ulcers, skin intact [de-identified] : left middle finger:\par Skin intact no swelling no erythema no ecchymosis\par 2 x 2 mm bluish palpable subcutaneous mass over the volar aspect just distal to the PIP joint\par Mild tenderness to palpation, noncompressible\par Range of motion of the digits and wrist intact without pain\par Sensation intact distally, capillary refill less than 2 seconds [Normal] : no peripheral adenopathy appreciated [de-identified] : 3 x-ray views of the left middle finger are reviewed there is mild degenerative changes of the PIP and DIP joints, no other osseous abnormality [de-identified] : radhar

## 2019-10-09 NOTE — ASSESSMENT
[FreeTextEntry1] : the patient is a 63-year-old female with several weeks of a small painful mass at the volar aspect of the left middle finger. Risks and benefits of observation versus surgical excision were discussed with patient, she wishes to proceed with surgery. She will be booked for a left middle finger mass excision and may continue activity as tolerated until that time.

## 2019-10-09 NOTE — CONSULT LETTER
[Consult Letter:] : I had the pleasure of evaluating your patient, [unfilled]. [Sincerely,] : Sincerely, [Please see my note below.] : Please see my note below. [Consult Closing:] : Thank you very much for allowing me to participate in the care of this patient.  If you have any questions, please do not hesitate to contact me. [FreeTextEntry3] : Dr. Imani Dubon\par Orthopaedic Surgery\par Hand & Upper Extremity.\par

## 2019-10-21 ENCOUNTER — RX RENEWAL (OUTPATIENT)
Age: 64
End: 2019-10-21

## 2019-10-30 NOTE — ASU PATIENT PROFILE, ADULT - PMH
Asthma with acute exacerbation, unspecified asthma severity, unspecified whether persistent    Depression, unspecified depression type    Hashimoto's disease    Hemorrhoids, unspecified hemorrhoid type    Hernia  hiatel  Hyperlipidemia, unspecified hyperlipidemia type    Hypertension, unspecified type    Obesity, unspecified classification, unspecified obesity type, unspecified whether serious comorbidity present    Osteoarthritis, unspecified osteoarthritis type, unspecified site    Other specified diabetes mellitus with other oral complication    Reflux esophagitis    Sleep apnea, unspecified type    Vertigo

## 2019-10-31 ENCOUNTER — OUTPATIENT (OUTPATIENT)
Dept: INPATIENT UNIT | Facility: HOSPITAL | Age: 64
LOS: 1 days | Discharge: HOME | End: 2019-10-31
Payer: COMMERCIAL

## 2019-10-31 ENCOUNTER — APPOINTMENT (OUTPATIENT)
Dept: ORTHOPEDIC SURGERY | Facility: HOSPITAL | Age: 64
End: 2019-10-31

## 2019-10-31 ENCOUNTER — RESULT REVIEW (OUTPATIENT)
Age: 64
End: 2019-10-31

## 2019-10-31 VITALS
HEART RATE: 69 BPM | RESPIRATION RATE: 16 BRPM | OXYGEN SATURATION: 95 % | WEIGHT: 195.11 LBS | SYSTOLIC BLOOD PRESSURE: 126 MMHG | HEIGHT: 66 IN | TEMPERATURE: 98 F | DIASTOLIC BLOOD PRESSURE: 67 MMHG

## 2019-10-31 VITALS
DIASTOLIC BLOOD PRESSURE: 68 MMHG | SYSTOLIC BLOOD PRESSURE: 125 MMHG | OXYGEN SATURATION: 96 % | HEART RATE: 69 BPM | TEMPERATURE: 98 F | RESPIRATION RATE: 16 BRPM

## 2019-10-31 DIAGNOSIS — Z88.2 ALLERGY STATUS TO SULFONAMIDES: ICD-10-CM

## 2019-10-31 DIAGNOSIS — Z79.82 LONG TERM (CURRENT) USE OF ASPIRIN: ICD-10-CM

## 2019-10-31 DIAGNOSIS — Z85.820 PERSONAL HISTORY OF MALIGNANT MELANOMA OF SKIN: ICD-10-CM

## 2019-10-31 DIAGNOSIS — I10 ESSENTIAL (PRIMARY) HYPERTENSION: ICD-10-CM

## 2019-10-31 DIAGNOSIS — E78.5 HYPERLIPIDEMIA, UNSPECIFIED: ICD-10-CM

## 2019-10-31 DIAGNOSIS — J45.909 UNSPECIFIED ASTHMA, UNCOMPLICATED: ICD-10-CM

## 2019-10-31 DIAGNOSIS — R22.32 LOCALIZED SWELLING, MASS AND LUMP, LEFT UPPER LIMB: ICD-10-CM

## 2019-10-31 DIAGNOSIS — Z87.891 PERSONAL HISTORY OF NICOTINE DEPENDENCE: ICD-10-CM

## 2019-10-31 DIAGNOSIS — S99.929S UNSPECIFIED INJURY OF UNSPECIFIED FOOT, SEQUELA: Chronic | ICD-10-CM

## 2019-10-31 DIAGNOSIS — Z90.49 ACQUIRED ABSENCE OF OTHER SPECIFIED PARTS OF DIGESTIVE TRACT: Chronic | ICD-10-CM

## 2019-10-31 DIAGNOSIS — K21.9 GASTRO-ESOPHAGEAL REFLUX DISEASE WITHOUT ESOPHAGITIS: ICD-10-CM

## 2019-10-31 DIAGNOSIS — Z79.51 LONG TERM (CURRENT) USE OF INHALED STEROIDS: ICD-10-CM

## 2019-10-31 DIAGNOSIS — E11.9 TYPE 2 DIABETES MELLITUS WITHOUT COMPLICATIONS: ICD-10-CM

## 2019-10-31 DIAGNOSIS — L72.9 FOLLICULAR CYST OF THE SKIN AND SUBCUTANEOUS TISSUE, UNSPECIFIED: ICD-10-CM

## 2019-10-31 PROCEDURE — 88305 TISSUE EXAM BY PATHOLOGIST: CPT | Mod: 26

## 2019-10-31 PROCEDURE — 26115 EXC HAND LES SC < 1.5 CM: CPT | Mod: F2

## 2019-10-31 PROCEDURE — 88305 TISSUE EXAM BY PATHOLOGIST: CPT

## 2019-10-31 RX ORDER — ALBUTEROL 90 UG/1
1 AEROSOL, METERED ORAL
Qty: 0 | Refills: 0 | DISCHARGE

## 2019-10-31 RX ORDER — MECLIZINE HCL 12.5 MG
1 TABLET ORAL
Qty: 0 | Refills: 0 | DISCHARGE

## 2019-10-31 RX ORDER — ESCITALOPRAM OXALATE 10 MG/1
1 TABLET, FILM COATED ORAL
Qty: 0 | Refills: 0 | DISCHARGE

## 2019-10-31 RX ORDER — ATORVASTATIN CALCIUM 80 MG/1
1 TABLET, FILM COATED ORAL
Qty: 0 | Refills: 0 | DISCHARGE

## 2019-10-31 RX ORDER — LINAGLIPTIN 5 MG/1
1 TABLET, FILM COATED ORAL
Qty: 0 | Refills: 0 | DISCHARGE

## 2019-10-31 RX ORDER — PSYLLIUM SEED (WITH DEXTROSE)
0 POWDER (GRAM) ORAL
Qty: 0 | Refills: 0 | DISCHARGE

## 2019-10-31 RX ORDER — FLUTICASONE PROPIONATE 220 MCG
2 AEROSOL WITH ADAPTER (GRAM) INHALATION
Qty: 0 | Refills: 0 | DISCHARGE

## 2019-10-31 RX ORDER — OMEGA-3 ACID ETHYL ESTERS 1 G
0 CAPSULE ORAL
Qty: 0 | Refills: 0 | DISCHARGE

## 2019-10-31 RX ORDER — LEVOTHYROXINE SODIUM 125 MCG
1 TABLET ORAL
Qty: 0 | Refills: 0 | DISCHARGE

## 2019-10-31 RX ORDER — MONTELUKAST 4 MG/1
1 TABLET, CHEWABLE ORAL
Qty: 0 | Refills: 0 | DISCHARGE

## 2019-10-31 RX ORDER — CHOLECALCIFEROL (VITAMIN D3) 125 MCG
1 CAPSULE ORAL
Qty: 0 | Refills: 0 | DISCHARGE

## 2019-10-31 RX ORDER — ESTROGENS, CONJUGATED 0.625 MG
1 TABLET ORAL
Qty: 0 | Refills: 0 | DISCHARGE

## 2019-10-31 RX ORDER — PANTOPRAZOLE SODIUM 20 MG/1
1 TABLET, DELAYED RELEASE ORAL
Qty: 0 | Refills: 0 | DISCHARGE

## 2019-10-31 RX ORDER — ALBUTEROL 90 UG/1
108 AEROSOL, METERED ORAL
Qty: 0 | Refills: 0 | DISCHARGE

## 2019-10-31 RX ORDER — OLOPATADINE HYDROCHLORIDE 1 MG/ML
1 SOLUTION/ DROPS OPHTHALMIC
Qty: 0 | Refills: 0 | DISCHARGE

## 2019-10-31 RX ORDER — IBUPROFEN 200 MG
0 TABLET ORAL
Qty: 0 | Refills: 0 | DISCHARGE

## 2019-10-31 RX ORDER — ASPIRIN/CALCIUM CARB/MAGNESIUM 324 MG
1 TABLET ORAL
Qty: 0 | Refills: 0 | DISCHARGE

## 2019-10-31 RX ORDER — MELOXICAM 15 MG/1
1 TABLET ORAL
Qty: 0 | Refills: 0 | DISCHARGE

## 2019-10-31 RX ORDER — LEVOCETIRIZINE DIHYDROCHLORIDE 0.5 MG/ML
1 SOLUTION ORAL
Qty: 0 | Refills: 0 | DISCHARGE

## 2019-10-31 NOTE — ASU DISCHARGE PLAN (ADULT/PEDIATRIC) - CALL YOUR DOCTOR IF YOU HAVE ANY OF THE FOLLOWING:
Pain not relieved by Medications/Numbness, tingling, color or temperature change to extremity/Swelling that gets worse/Bleeding that does not stop/Wound/Surgical Site with redness, or foul smelling discharge or pus

## 2019-10-31 NOTE — ASU DISCHARGE PLAN (ADULT/PEDIATRIC) - ASU DC SPECIAL INSTRUCTIONSFT
1. Keep bandage on for 4 days. Then you can remove and get it wet. Otherwise cover hand with plastic bag for showering.    2. If you have a splint on, keep it on until you see Dr. Dubon in the office. Do not get the splint wet at all.    3. Elevate hand as much as possible and wiggle fingers as much as you can, as often as you think of it (wiggle 10 times every commercial  if you are watching TV, or reading a book after every 5 pages read). The exception is if you have a splint you will not be able to wiggle the affected digit. Try to wiggle the free ones though.    4. Walk plenty, no sitting around.    5. See Dr. Dubon in the office in about 7-10 days. Call to schedule. You will have you wound checked then, any sutures will be removed, and your splint (if you have one on) will be changed.

## 2019-11-11 ENCOUNTER — APPOINTMENT (OUTPATIENT)
Dept: ORTHOPEDIC SURGERY | Facility: CLINIC | Age: 64
End: 2019-11-11
Payer: COMMERCIAL

## 2019-11-11 DIAGNOSIS — R22.32 LOCALIZED SWELLING, MASS AND LUMP, LEFT UPPER LIMB: ICD-10-CM

## 2019-11-11 PROCEDURE — 99024 POSTOP FOLLOW-UP VISIT: CPT

## 2019-11-11 NOTE — HISTORY OF PRESENT ILLNESS
[de-identified] : DOS: 10/31/2019\par Procedure: Left Middle Finger Mass Excision [de-identified] : 11/11/2019: The patient returns to the office 11 days removed from a left middle finger mass excision. Overall she is doing well and has no complaints of pain. She has been working on range of motion. She feels some stiffness at the DIP joint on the volar aspect, however, overall she is doing well with range of motion. She denies any paresthesias, fevers, or chills. [de-identified] : Patient postop day 11 from a left middle finger mass excision [de-identified] : Left middle finger exam\par \par There is a well healed incision to the volar aspect of the left middle finger. There are no signs of infection. 2 sutures were discontinued today and a Band-Aid was placed. Range of motion is fully intact to the MCP, PIP, and DIP joints. Sensation is grossly intact and capillary refill is brisk. Pathology results: Benign blood filled cyst [de-identified] : Overall the patient is doing well. She will continue to work on range of motion exercises. She will keep the wound clean and dry at all times. She will return back to the office as needed and will be activities as tolerated. The patient is in agreement with this plan.

## 2019-11-11 NOTE — HISTORY OF PRESENT ILLNESS
[de-identified] : DOS: 10/31/2019\par Procedure: Left Middle Finger Mass Excision [de-identified] : 11/11/2019: The patient returns to the office 11 days removed from a left middle finger mass excision. Overall she is doing well and has no complaints of pain. She has been working on range of motion. She feels some stiffness at the DIP joint on the volar aspect, however, overall she is doing well with range of motion. She denies any paresthesias, fevers, or chills. [de-identified] : Patient postop day 11 from a left middle finger mass excision [de-identified] : Left middle finger exam\par \par There is a well healed incision to the volar aspect of the left middle finger. There are no signs of infection. 2 sutures were discontinued today and a Band-Aid was placed. Range of motion is fully intact to the MCP, PIP, and DIP joints. Sensation is grossly intact and capillary refill is brisk. Pathology results: Benign blood filled cyst [de-identified] : Overall the patient is doing well. She will continue to work on range of motion exercises. She will keep the wound clean and dry at all times. She will return back to the office as needed and will be activities as tolerated. The patient is in agreement with this plan.

## 2019-12-05 ENCOUNTER — APPOINTMENT (OUTPATIENT)
Dept: FAMILY MEDICINE | Facility: CLINIC | Age: 64
End: 2019-12-05

## 2019-12-08 ENCOUNTER — RX RENEWAL (OUTPATIENT)
Age: 64
End: 2019-12-08

## 2019-12-23 ENCOUNTER — NON-APPOINTMENT (OUTPATIENT)
Age: 64
End: 2019-12-23

## 2019-12-23 ENCOUNTER — APPOINTMENT (OUTPATIENT)
Dept: FAMILY MEDICINE | Facility: CLINIC | Age: 64
End: 2019-12-23
Payer: COMMERCIAL

## 2019-12-23 VITALS
SYSTOLIC BLOOD PRESSURE: 106 MMHG | HEIGHT: 66 IN | RESPIRATION RATE: 16 BRPM | OXYGEN SATURATION: 94 % | DIASTOLIC BLOOD PRESSURE: 60 MMHG | BODY MASS INDEX: 30.22 KG/M2 | HEART RATE: 74 BPM | WEIGHT: 188 LBS

## 2019-12-23 VITALS — RESPIRATION RATE: 16 BRPM | SYSTOLIC BLOOD PRESSURE: 124 MMHG | DIASTOLIC BLOOD PRESSURE: 80 MMHG | HEART RATE: 72 BPM

## 2019-12-23 DIAGNOSIS — K14.0 GLOSSITIS: ICD-10-CM

## 2019-12-23 PROCEDURE — 93000 ELECTROCARDIOGRAM COMPLETE: CPT

## 2019-12-23 PROCEDURE — 99396 PREV VISIT EST AGE 40-64: CPT | Mod: 25

## 2019-12-23 PROCEDURE — 99213 OFFICE O/P EST LOW 20 MIN: CPT | Mod: 25

## 2019-12-23 RX ORDER — AMOXICILLIN 500 MG/1
500 CAPSULE ORAL
Qty: 21 | Refills: 0 | Status: COMPLETED | COMMUNITY
Start: 2019-07-24 | End: 2019-12-23

## 2019-12-23 RX ORDER — IBUPROFEN 800 MG/1
800 TABLET, FILM COATED ORAL
Qty: 10 | Refills: 0 | Status: COMPLETED | COMMUNITY
Start: 2019-07-24 | End: 2019-12-23

## 2019-12-23 NOTE — HISTORY OF PRESENT ILLNESS
[FreeTextEntry1] : pt here for cpe  and  management of HTN HLD DM  HYUPOTHYROID  [de-identified] : FEELING WELL HAS  TONGUE PAIN   SINCE  HAD  FLU 1 MO

## 2019-12-23 NOTE — HEALTH RISK ASSESSMENT
[Intercurrent ED visits] : went to ED [Very Good] : ~his/her~  mood as very good [Yes] : Yes [2 - 4 times a month (2 pts)] : 2-4 times a month (2 points) [0] : 2) Feeling down, depressed, or hopeless: Not at all (0) [Patient reported colonoscopy was normal] : Patient reported colonoscopy was normal [With Family] : lives with family [Employed] : employed [College] : College [] :  [# Of Children ___] : has [unfilled] children [Smoke Detector] : smoke detector [Carbon Monoxide Detector] : carbon monoxide detector [Seat Belt] :  uses seat belt [[id number]] : 0 [] : No [de-identified] : TAYE  [Change in mental status noted] : No change in mental status noted [Reports changes in hearing] : Reports no changes in hearing [Reports changes in vision] : Reports no changes in vision [Reports changes in dental health] : Reports no changes in dental health [ColonoscopyDate] : 4/19 [de-identified] : TA

## 2019-12-23 NOTE — REVIEW OF SYSTEMS
[Fatigue] : fatigue [Chest Pain] : chest pain [Incontinence] : incontinence [Joint Pain] : joint pain [Dizziness] : dizziness [Negative] : Eyes [Earache] : no earache [Sore Throat] : no sore throat [Dyspnea on Exertion] : no dyspnea on exertion [Shortness Of Breath] : no shortness of breath [Diarrhea] : diarrhea [Constipation] : no constipation [Mole Changes] : no mole changes

## 2019-12-23 NOTE — PHYSICAL EXAM
[No Acute Distress] : no acute distress [PERRL] : pupils equal round and reactive to light [No Lymphadenopathy] : no lymphadenopathy [Clear to Auscultation] : lungs were clear to auscultation bilaterally [Normal] : soft, non-tender, non-distended, no masses palpated, no HSM and normal bowel sounds [Regular Rhythm] : with a regular rhythm [No Edema] : there was no peripheral edema [No Joint Swelling] : no joint swelling [No Skin Lesions] : no skin lesions [Normal Gait] : normal gait [No Focal Deficits] : no focal deficits [Normal Insight/Judgement] : insight and judgment were intact [de-identified] : TONGUE  COATED

## 2019-12-24 ENCOUNTER — TRANSCRIPTION ENCOUNTER (OUTPATIENT)
Age: 64
End: 2019-12-24

## 2019-12-24 LAB
ALBUMIN SERPL ELPH-MCNC: 4.7 G/DL
ALP BLD-CCNC: 69 U/L
ALT SERPL-CCNC: 30 U/L
ANION GAP SERPL CALC-SCNC: 15 MMOL/L
APPEARANCE: CLEAR
AST SERPL-CCNC: 24 U/L
BACTERIA: NEGATIVE
BASOPHILS # BLD AUTO: 0.05 K/UL
BASOPHILS NFR BLD AUTO: 0.8 %
BILIRUB SERPL-MCNC: 1.4 MG/DL
BILIRUBIN URINE: NEGATIVE
BLOOD URINE: NEGATIVE
BUN SERPL-MCNC: 13 MG/DL
CALCIUM SERPL-MCNC: 10.1 MG/DL
CHLORIDE SERPL-SCNC: 101 MMOL/L
CHOLEST SERPL-MCNC: 177 MG/DL
CHOLEST/HDLC SERPL: 2.8 RATIO
CO2 SERPL-SCNC: 27 MMOL/L
COLOR: ABNORMAL
CREAT SERPL-MCNC: 0.83 MG/DL
CREAT SPEC-SCNC: 307 MG/DL
EOSINOPHIL # BLD AUTO: 0.05 K/UL
EOSINOPHIL NFR BLD AUTO: 0.8 %
ESTIMATED AVERAGE GLUCOSE: 126 MG/DL
GLUCOSE QUALITATIVE U: NEGATIVE
GLUCOSE SERPL-MCNC: 91 MG/DL
HBA1C MFR BLD HPLC: 6 %
HCT VFR BLD CALC: 43.4 %
HDLC SERPL-MCNC: 63 MG/DL
HGB BLD-MCNC: 14.3 G/DL
HYALINE CASTS: 0 /LPF
IMM GRANULOCYTES NFR BLD AUTO: 0.2 %
KETONES URINE: NEGATIVE
LDLC SERPL CALC-MCNC: 89 MG/DL
LEUKOCYTE ESTERASE URINE: ABNORMAL
LYMPHOCYTES # BLD AUTO: 2.3 K/UL
LYMPHOCYTES NFR BLD AUTO: 38.7 %
MAN DIFF?: NORMAL
MCHC RBC-ENTMCNC: 31.4 PG
MCHC RBC-ENTMCNC: 32.9 GM/DL
MCV RBC AUTO: 95.4 FL
MICROALBUMIN 24H UR DL<=1MG/L-MCNC: 1.5 MG/DL
MICROALBUMIN/CREAT 24H UR-RTO: 5 MG/G
MICROSCOPIC-UA: NORMAL
MONOCYTES # BLD AUTO: 0.6 K/UL
MONOCYTES NFR BLD AUTO: 10.1 %
NEUTROPHILS # BLD AUTO: 2.93 K/UL
NEUTROPHILS NFR BLD AUTO: 49.4 %
NITRITE URINE: NEGATIVE
PH URINE: 7
PLATELET # BLD AUTO: 253 K/UL
POTASSIUM SERPL-SCNC: 4.1 MMOL/L
PROT SERPL-MCNC: 7.1 G/DL
PROTEIN URINE: ABNORMAL
RBC # BLD: 4.55 M/UL
RBC # FLD: 12.9 %
RED BLOOD CELLS URINE: 2 /HPF
SODIUM SERPL-SCNC: 143 MMOL/L
SPECIFIC GRAVITY URINE: 1.03
SQUAMOUS EPITHELIAL CELLS: 6 /HPF
T4 SERPL-MCNC: 9.1 UG/DL
TRIGL SERPL-MCNC: 123 MG/DL
TSH SERPL-ACNC: 0.46 UIU/ML
URATE SERPL-MCNC: 6.2 MG/DL
UROBILINOGEN URINE: NORMAL
WBC # FLD AUTO: 5.94 K/UL
WHITE BLOOD CELLS URINE: 3 /HPF

## 2020-01-03 ENCOUNTER — RX RENEWAL (OUTPATIENT)
Age: 65
End: 2020-01-03

## 2020-01-09 ENCOUNTER — RX RENEWAL (OUTPATIENT)
Age: 65
End: 2020-01-09

## 2020-03-06 ENCOUNTER — RX RENEWAL (OUTPATIENT)
Age: 65
End: 2020-03-06

## 2020-03-10 ENCOUNTER — APPOINTMENT (OUTPATIENT)
Dept: PULMONOLOGY | Facility: CLINIC | Age: 65
End: 2020-03-10
Payer: COMMERCIAL

## 2020-03-10 VITALS
DIASTOLIC BLOOD PRESSURE: 70 MMHG | OXYGEN SATURATION: 96 % | HEART RATE: 80 BPM | BODY MASS INDEX: 31.96 KG/M2 | WEIGHT: 198 LBS | SYSTOLIC BLOOD PRESSURE: 120 MMHG

## 2020-03-10 PROCEDURE — 99214 OFFICE O/P EST MOD 30 MIN: CPT

## 2020-03-10 NOTE — PHYSICAL EXAM
[Normal Conjunctiva] : the conjunctiva exhibited no abnormalities [Elongated Uvula] : elongated uvula [Enlarged Base of the Tongue] : enlargement of the base of the tongue [Neck Appearance] : the appearance of the neck was normal [Jugular Venous Distention Increased] : there was no jugular-venous distention [Thyroid Diffuse Enlargement] : the thyroid was not enlarged [Heart Sounds] : normal S1 and S2 [Arterial Pulses Normal] : the arterial pulses were normal [Edema] : no peripheral edema present [Respiration, Rhythm And Depth] : normal respiratory rhythm and effort [Auscultation Breath Sounds / Voice Sounds] : lungs were clear to auscultation bilaterally [Lungs Percussion] : the lungs were normal to percussion [Bowel Sounds] : normal bowel sounds [Abdomen Soft] : soft [Abdomen Tenderness] : non-tender [Abnormal Walk] : normal gait [Nail Clubbing] : no clubbing of the fingernails [Cyanosis, Localized] : no localized cyanosis [No Focal Deficits] : no focal deficits [Oriented To Time, Place, And Person] : oriented to person, place, and time [Impaired Insight] : insight and judgment were intact [Affect] : the affect was normal [Memory Recent] : recent memory was not impaired [Skin Turgor] : normal skin turgor [] : no rash [FreeTextEntry1] : obese

## 2020-03-10 NOTE — HISTORY OF PRESENT ILLNESS
[Snoring] : snoring [Frequent Nocturnal Awakening] : frequent nocturnal awakening [Daytime Somnolence] : daytime somnolence [ESS: ___] : ESS score [unfilled] [Unintentional Sleep While Inactive] : unintentional sleep while inactive [Awakes Unrefreshed] : awakening unrefreshed [FreeTextEntry1] : 60 yo overweight female with H/O EDYTA and continued snoring, choking and gasping during sleep and non-restorative sleep.  Documented to have significant ongoing reflux despite fundal plication in the past.\par Diagnosed with EDYTA by Dr Causey in the past\par CPAP failure in the past\par \par 6/12/18\par Using cpap and doing well\par Straps uncomfortable in hair\par \par 12/6/18\par Doing well\par Strap issues persist\par Losing weight on Weight Watchers

## 2020-03-11 PROBLEM — E13.638: Chronic | Status: ACTIVE | Noted: 2019-10-30

## 2020-03-11 PROBLEM — E66.9 OBESITY, UNSPECIFIED: Chronic | Status: ACTIVE | Noted: 2019-10-30

## 2020-03-11 PROBLEM — E78.5 HYPERLIPIDEMIA, UNSPECIFIED: Chronic | Status: ACTIVE | Noted: 2019-10-30

## 2020-03-11 PROBLEM — K64.9 UNSPECIFIED HEMORRHOIDS: Chronic | Status: ACTIVE | Noted: 2019-10-30

## 2020-03-11 PROBLEM — I10 ESSENTIAL (PRIMARY) HYPERTENSION: Chronic | Status: ACTIVE | Noted: 2019-10-30

## 2020-03-11 PROBLEM — F32.9 MAJOR DEPRESSIVE DISORDER, SINGLE EPISODE, UNSPECIFIED: Chronic | Status: ACTIVE | Noted: 2019-10-30

## 2020-03-11 PROBLEM — J45.901 UNSPECIFIED ASTHMA WITH (ACUTE) EXACERBATION: Chronic | Status: ACTIVE | Noted: 2019-10-30

## 2020-03-11 PROBLEM — K46.9 UNSPECIFIED ABDOMINAL HERNIA WITHOUT OBSTRUCTION OR GANGRENE: Chronic | Status: ACTIVE | Noted: 2019-10-30

## 2020-03-11 PROBLEM — M19.90 UNSPECIFIED OSTEOARTHRITIS, UNSPECIFIED SITE: Chronic | Status: ACTIVE | Noted: 2019-10-30

## 2020-03-11 PROBLEM — G47.30 SLEEP APNEA, UNSPECIFIED: Chronic | Status: ACTIVE | Noted: 2019-10-30

## 2020-03-11 PROBLEM — K21.0 GASTRO-ESOPHAGEAL REFLUX DISEASE WITH ESOPHAGITIS: Chronic | Status: ACTIVE | Noted: 2019-10-30

## 2020-03-11 PROBLEM — E06.3 AUTOIMMUNE THYROIDITIS: Chronic | Status: ACTIVE | Noted: 2019-10-30

## 2020-03-11 PROBLEM — R42 DIZZINESS AND GIDDINESS: Chronic | Status: ACTIVE | Noted: 2019-10-30

## 2020-03-24 ENCOUNTER — RX RENEWAL (OUTPATIENT)
Age: 65
End: 2020-03-24

## 2020-04-21 ENCOUNTER — APPOINTMENT (OUTPATIENT)
Dept: FAMILY MEDICINE | Facility: CLINIC | Age: 65
End: 2020-04-21
Payer: COMMERCIAL

## 2020-04-21 VITALS
WEIGHT: 197 LBS | SYSTOLIC BLOOD PRESSURE: 110 MMHG | RESPIRATION RATE: 16 BRPM | BODY MASS INDEX: 31.66 KG/M2 | OXYGEN SATURATION: 97 % | HEIGHT: 66 IN | HEART RATE: 69 BPM | DIASTOLIC BLOOD PRESSURE: 70 MMHG

## 2020-04-21 VITALS — DIASTOLIC BLOOD PRESSURE: 80 MMHG | RESPIRATION RATE: 16 BRPM | HEART RATE: 72 BPM | SYSTOLIC BLOOD PRESSURE: 110 MMHG

## 2020-04-21 PROCEDURE — 20610 DRAIN/INJ JOINT/BURSA W/O US: CPT

## 2020-04-21 PROCEDURE — 99214 OFFICE O/P EST MOD 30 MIN: CPT | Mod: 25

## 2020-04-21 RX ORDER — METHYLPRED ACET/NACL,ISO-OS/PF 40 MG/ML
40 VIAL (ML) INJECTION
Qty: 1 | Refills: 0 | Status: COMPLETED | OUTPATIENT
Start: 2020-04-21

## 2020-04-21 RX ADMIN — METHYLPREDNISOLONE ACETATE 40 MG/ML: 40 INJECTION, SUSPENSION INTRA-ARTICULAR; INTRALESIONAL; INTRAMUSCULAR; SOFT TISSUE at 00:00

## 2020-04-21 NOTE — PHYSICAL EXAM
[No Acute Distress] : no acute distress [PERRL] : pupils equal round and reactive to light [No Lymphadenopathy] : no lymphadenopathy [Clear to Auscultation] : lungs were clear to auscultation bilaterally [Regular Rhythm] : with a regular rhythm [Normal] : soft, non-tender, non-distended, no masses palpated, no HSM and normal bowel sounds [de-identified] : left  shoulder decrease rom

## 2020-04-21 NOTE — HISTORY OF PRESENT ILLNESS
[Depression] : Depression [Diabetes Mellitus] : Diabetes Mellitus [Hyperlipidemia] : Hyperlipidemia [Hypertension] : Hypertension [Check glucose ___ x/week] : Patient checks blood glucose [unfilled]  times a week [Most Recent A1C: ___] : Most recent A1C was [unfilled] [Does not check BP] : The patient is not checking blood pressure [120/80] : Target blood pressure is 120/80 [Target goal met] : BP target goal met [Doing Well] : Patient is doing well [Managed with medications] : managed with  medication [High Intensity therapy] : Patient is currently on high intensity statin therapy [No New Symptoms] : Patient denies any new symptoms [Stable] : Overall status has been stable [FreeTextEntry6] : left  shoulder pain for 1 mo  has had  similar episode  in past that  responded to pt starting to have  vertiog again

## 2020-06-01 ENCOUNTER — RX RENEWAL (OUTPATIENT)
Age: 65
End: 2020-06-01

## 2020-06-29 ENCOUNTER — RX RENEWAL (OUTPATIENT)
Age: 65
End: 2020-06-29

## 2020-07-28 ENCOUNTER — RX RENEWAL (OUTPATIENT)
Age: 65
End: 2020-07-28

## 2020-08-18 ENCOUNTER — APPOINTMENT (OUTPATIENT)
Dept: FAMILY MEDICINE | Facility: CLINIC | Age: 65
End: 2020-08-18
Payer: COMMERCIAL

## 2020-08-18 VITALS
RESPIRATION RATE: 16 BRPM | SYSTOLIC BLOOD PRESSURE: 110 MMHG | BODY MASS INDEX: 31.02 KG/M2 | HEIGHT: 66 IN | HEART RATE: 63 BPM | WEIGHT: 193 LBS | DIASTOLIC BLOOD PRESSURE: 70 MMHG

## 2020-08-18 VITALS — SYSTOLIC BLOOD PRESSURE: 110 MMHG | DIASTOLIC BLOOD PRESSURE: 80 MMHG | RESPIRATION RATE: 16 BRPM | HEART RATE: 60 BPM

## 2020-08-18 DIAGNOSIS — S89.91XA UNSPECIFIED INJURY OF RIGHT LOWER LEG, INITIAL ENCOUNTER: ICD-10-CM

## 2020-08-18 DIAGNOSIS — S82.209A UNSPECIFIED FRACTURE OF SHAFT OF UNSPECIFIED TIBIA, INITIAL ENCOUNTER FOR CLOSED FRACTURE: ICD-10-CM

## 2020-08-18 PROCEDURE — 99495 TRANSJ CARE MGMT MOD F2F 14D: CPT

## 2020-08-18 RX ORDER — PSYLLIUM HUSK 0.4 G
0.52 CAPSULE ORAL
Refills: 0 | Status: COMPLETED | COMMUNITY
End: 2020-08-18

## 2020-08-18 NOTE — PHYSICAL EXAM
[No Acute Distress] : no acute distress [Normal Oropharynx] : the oropharynx was normal [PERRL] : pupils equal round and reactive to light [No Edema] : there was no peripheral edema [Supple] : supple [Clear to Auscultation] : lungs were clear to auscultation bilaterally [Normal] : soft, non-tender, non-distended, no masses palpated, no HSM and normal bowel sounds [No Rash] : no rash [Normal Insight/Judgement] : insight and judgment were intact [de-identified] : rt leg in brace

## 2020-08-18 NOTE — ASSESSMENT
[FreeTextEntry1] : continue  PT DELAY SHOULDER SURGERY UNTIL  RECOVERED RTC 1 MO CHECK GLUCOSE MORE FREQUENTLY SINCE PT WILL BE INACTIVE

## 2020-08-18 NOTE — HISTORY OF PRESENT ILLNESS
[FreeTextEntry1] : u  [de-identified] : was  admitted  to [facility] after  knee  injury was on 8/8/20 was in for 3 days no surgery going to PT  mri showewd tear in acl and mcl ant  chipped tibia not taking anything for pain seeing ortho  DR Solorio

## 2020-09-19 ENCOUNTER — RX RENEWAL (OUTPATIENT)
Age: 65
End: 2020-09-19

## 2020-09-22 ENCOUNTER — APPOINTMENT (OUTPATIENT)
Dept: FAMILY MEDICINE | Facility: CLINIC | Age: 65
End: 2020-09-22
Payer: COMMERCIAL

## 2020-09-22 VITALS
HEART RATE: 68 BPM | RESPIRATION RATE: 16 BRPM | HEIGHT: 66 IN | DIASTOLIC BLOOD PRESSURE: 70 MMHG | OXYGEN SATURATION: 97 % | SYSTOLIC BLOOD PRESSURE: 120 MMHG | WEIGHT: 193 LBS | BODY MASS INDEX: 31.02 KG/M2 | TEMPERATURE: 97.7 F

## 2020-09-22 VITALS — HEART RATE: 72 BPM | DIASTOLIC BLOOD PRESSURE: 80 MMHG | SYSTOLIC BLOOD PRESSURE: 134 MMHG | RESPIRATION RATE: 16 BRPM

## 2020-09-22 PROCEDURE — G0008: CPT

## 2020-09-22 PROCEDURE — 90686 IIV4 VACC NO PRSV 0.5 ML IM: CPT

## 2020-09-22 PROCEDURE — 99214 OFFICE O/P EST MOD 30 MIN: CPT | Mod: 25

## 2020-09-22 RX ORDER — TRIAMCINOLONE ACETONIDE 1 MG/G
0.1 PASTE DENTAL TWICE DAILY
Qty: 1 | Refills: 3 | Status: COMPLETED | COMMUNITY
Start: 2019-12-23 | End: 2020-09-22

## 2020-09-22 RX ORDER — CONJUGATED ESTROGENS 0.62 MG/G
0.62 CREAM VAGINAL
Qty: 90 | Refills: 0 | Status: COMPLETED | COMMUNITY
Start: 2016-10-31 | End: 2020-09-22

## 2020-09-22 RX ORDER — PANTOPRAZOLE 40 MG/1
40 TABLET, DELAYED RELEASE ORAL
Qty: 180 | Refills: 1 | Status: COMPLETED | COMMUNITY
Start: 2017-04-13 | End: 2020-09-22

## 2020-09-22 RX ORDER — MELOXICAM 15 MG/1
15 TABLET ORAL
Qty: 30 | Refills: 0 | Status: COMPLETED | COMMUNITY
Start: 2020-04-21 | End: 2020-09-22

## 2020-09-22 NOTE — REVIEW OF SYSTEMS
[Fever] : no fever [Chest Pain] : no chest pain [Palpitations] : no palpitations [Wheezing] : no wheezing [Constipation] : no constipation [Diarrhea] : diarrhea

## 2020-09-22 NOTE — HISTORY OF PRESENT ILLNESS
[Diabetes Mellitus] : Diabetes Mellitus [Hyperlipidemia] : Hyperlipidemia [Hypertension] : Hypertension [Check glucose ___ x/day] : Patient checks  blood glucose [unfilled]  times a day [Most Recent A1C: ___] : Most recent A1C was [unfilled] [Does not check BP] : The patient is not checking blood pressure [120/80] : Target blood pressure is 120/80 [Target goal met] : BP target goal met [Intermittent] : intermittent [None] : Patient does not have any asthma related symptoms [Well controlled] : Condition is well controlled [FreeTextEntry6] : going to pt  for knee  going for shoulder  surgery in oct

## 2020-09-22 NOTE — PHYSICAL EXAM
[No Acute Distress] : no acute distress [PERRL] : pupils equal round and reactive to light [Normal Oropharynx] : the oropharynx was normal [Supple] : supple [Clear to Auscultation] : lungs were clear to auscultation bilaterally [Regular Rhythm] : with a regular rhythm [No Murmur] : no murmur heard [No Edema] : there was no peripheral edema [Normal] : soft, non-tender, non-distended, no masses palpated, no HSM and normal bowel sounds [No Rash] : no rash [Normal Insight/Judgement] : insight and judgment were intact [de-identified] : rt leg in brace

## 2020-09-23 ENCOUNTER — TRANSCRIPTION ENCOUNTER (OUTPATIENT)
Age: 65
End: 2020-09-23

## 2020-09-23 LAB
ALBUMIN SERPL ELPH-MCNC: 4.7 G/DL
ALP BLD-CCNC: 80 U/L
ALT SERPL-CCNC: 23 U/L
ANION GAP SERPL CALC-SCNC: 12 MMOL/L
APPEARANCE: CLEAR
AST SERPL-CCNC: 20 U/L
BACTERIA: NEGATIVE
BASOPHILS # BLD AUTO: 0.07 K/UL
BASOPHILS NFR BLD AUTO: 1 %
BILIRUB SERPL-MCNC: 0.5 MG/DL
BILIRUBIN URINE: NEGATIVE
BLOOD URINE: NEGATIVE
BUN SERPL-MCNC: 17 MG/DL
CALCIUM SERPL-MCNC: 9.8 MG/DL
CHLORIDE SERPL-SCNC: 100 MMOL/L
CHOLEST SERPL-MCNC: 202 MG/DL
CHOLEST/HDLC SERPL: 3.4 RATIO
CO2 SERPL-SCNC: 28 MMOL/L
COLOR: NORMAL
CREAT SERPL-MCNC: 0.77 MG/DL
CREAT SPEC-SCNC: 53 MG/DL
EOSINOPHIL # BLD AUTO: 0.13 K/UL
EOSINOPHIL NFR BLD AUTO: 1.9 %
ESTIMATED AVERAGE GLUCOSE: 120 MG/DL
GLUCOSE QUALITATIVE U: NEGATIVE
GLUCOSE SERPL-MCNC: 85 MG/DL
HBA1C MFR BLD HPLC: 5.8 %
HCT VFR BLD CALC: 41.9 %
HDLC SERPL-MCNC: 60 MG/DL
HGB BLD-MCNC: 13.6 G/DL
HYALINE CASTS: 0 /LPF
IMM GRANULOCYTES NFR BLD AUTO: 0.3 %
KETONES URINE: NEGATIVE
LDLC SERPL CALC-MCNC: 67 MG/DL
LEUKOCYTE ESTERASE URINE: ABNORMAL
LYMPHOCYTES # BLD AUTO: 2.17 K/UL
LYMPHOCYTES NFR BLD AUTO: 31 %
MAN DIFF?: NORMAL
MCHC RBC-ENTMCNC: 31.7 PG
MCHC RBC-ENTMCNC: 32.5 GM/DL
MCV RBC AUTO: 97.7 FL
MICROALBUMIN 24H UR DL<=1MG/L-MCNC: <1.2 MG/DL
MICROALBUMIN/CREAT 24H UR-RTO: NORMAL MG/G
MICROSCOPIC-UA: NORMAL
MONOCYTES # BLD AUTO: 0.69 K/UL
MONOCYTES NFR BLD AUTO: 9.9 %
NEUTROPHILS # BLD AUTO: 3.91 K/UL
NEUTROPHILS NFR BLD AUTO: 55.9 %
NITRITE URINE: NEGATIVE
PH URINE: 6
PLATELET # BLD AUTO: 283 K/UL
POTASSIUM SERPL-SCNC: 4.2 MMOL/L
PROT SERPL-MCNC: 7.2 G/DL
PROTEIN URINE: NEGATIVE
RBC # BLD: 4.29 M/UL
RBC # FLD: 12.8 %
RED BLOOD CELLS URINE: 1 /HPF
SODIUM SERPL-SCNC: 139 MMOL/L
SPECIFIC GRAVITY URINE: 1.01
SQUAMOUS EPITHELIAL CELLS: 1 /HPF
T4 SERPL-MCNC: 8 UG/DL
TRIGL SERPL-MCNC: 378 MG/DL
TSH SERPL-ACNC: 1.67 UIU/ML
URATE SERPL-MCNC: 3.6 MG/DL
UROBILINOGEN URINE: NORMAL
WBC # FLD AUTO: 6.99 K/UL
WHITE BLOOD CELLS URINE: 1 /HPF

## 2020-09-28 ENCOUNTER — APPOINTMENT (OUTPATIENT)
Dept: PULMONOLOGY | Facility: CLINIC | Age: 65
End: 2020-09-28

## 2020-10-16 ENCOUNTER — APPOINTMENT (OUTPATIENT)
Dept: FAMILY MEDICINE | Facility: CLINIC | Age: 65
End: 2020-10-16
Payer: COMMERCIAL

## 2020-10-16 VITALS
WEIGHT: 200.38 LBS | BODY MASS INDEX: 32.2 KG/M2 | RESPIRATION RATE: 16 BRPM | HEIGHT: 66 IN | SYSTOLIC BLOOD PRESSURE: 110 MMHG | TEMPERATURE: 97.5 F | HEART RATE: 72 BPM | DIASTOLIC BLOOD PRESSURE: 64 MMHG | OXYGEN SATURATION: 97 %

## 2020-10-16 DIAGNOSIS — M75.102 UNSPECIFIED ROTATOR CUFF TEAR OR RUPTURE OF LEFT SHOULDER, NOT SPECIFIED AS TRAUMATIC: ICD-10-CM

## 2020-10-16 DIAGNOSIS — Z01.818 ENCOUNTER FOR OTHER PREPROCEDURAL EXAMINATION: ICD-10-CM

## 2020-10-16 DIAGNOSIS — Z87.09 PERSONAL HISTORY OF OTHER DISEASES OF THE RESPIRATORY SYSTEM: ICD-10-CM

## 2020-10-16 PROCEDURE — 99214 OFFICE O/P EST MOD 30 MIN: CPT

## 2020-10-16 RX ORDER — VALSARTAN AND HYDROCHLOROTHIAZIDE 160; 12.5 MG/1; MG/1
160-12.5 TABLET, FILM COATED ORAL DAILY
Qty: 90 | Refills: 2 | Status: DISCONTINUED | COMMUNITY
Start: 2019-02-04 | End: 2020-10-16

## 2020-10-16 RX ORDER — TRAMADOL HYDROCHLORIDE 50 MG/1
50 TABLET, COATED ORAL
Qty: 60 | Refills: 0 | Status: DISCONTINUED | COMMUNITY
Start: 2020-09-22 | End: 2020-10-16

## 2020-10-16 NOTE — PHYSICAL EXAM
[No Acute Distress] : no acute distress [Well Developed] : well developed [Well Nourished] : well nourished [Well-Appearing] : well-appearing [PERRL] : pupils equal round and reactive to light [Normal Outer Ear/Nose] : the outer ears and nose were normal in appearance [Normal Sclera/Conjunctiva] : normal sclera/conjunctiva [Neck Supple] : was supple [Normal TMs] : both tympanic membranes were normal [Normal Appearance] : was normal in appearance [Clear to Auscultation] : lungs were clear to auscultation bilaterally [No Respiratory Distress] : no respiratory distress  [Normal Rate] : normal rate  [Regular Rhythm] : with a regular rhythm [Normal S1, S2] : normal S1 and S2 [No Murmur] : no murmur heard [Soft] : abdomen soft [No Edema] : there was no peripheral edema [Normal Anterior Cervical Nodes] : no anterior cervical lymphadenopathy [Normal Posterior Cervical Nodes] : no posterior cervical lymphadenopathy [Non Tender] : non-tender [Normal Mood] : the mood was normal [Alert and Oriented x3] : oriented to person, place, and time [Normal Affect] : the affect was normal

## 2020-10-16 NOTE — HISTORY OF PRESENT ILLNESS
[No Pertinent Cardiac History] : no history of aortic stenosis, atrial fibrillation, coronary artery disease, recent myocardial infarction, or implantable device/pacemaker [Asthma] : asthma [Sleep Apnea] : sleep apnea [No Adverse Anesthesia Reaction] : no adverse anesthesia reaction in self or family member [Diabetes] : diabetes [(Patient denies any chest pain, claudication, dyspnea on exertion, orthopnea, palpitations or syncope)] : Patient denies any chest pain, claudication, dyspnea on exertion, orthopnea, palpitations or syncope [COPD] : no COPD [Smoker] : not a smoker [Chronic Anticoagulation] : no chronic anticoagulation [Chronic Kidney Disease] : no chronic kidney disease [FreeTextEntry1] : Left rotator cuff repair [FreeTextEntry3] : Dr. Izaguirre [FreeTextEntry2] : 10/20/2020 [FreeTextEntry4] : \par 64 year old female presents for medical clearance. Has worsening left shoulder pain and decreased range of motion secondary from left rotator cuff tear. Has been following with orthopedics, tried physical therapy, has decided to go for surgery.\par \par Has HTN- on Losartan- Hydrochlorothiazide \par \par Has Hyperlipidemia- on Atorvastatin \par \par Has Hypothyroidism- on Levothyroxine\par \par Has DM II- well controlled, on Tradjenta\par \par Has Asthma- has been stable, on Montelukast, Advair \par \par Has EDYTA- on CPAP\par

## 2020-10-16 NOTE — ASSESSMENT
[Ischemic Heart Disease] : Ischemic Heart Disease - No (0) [High Risk Surgery - Intraperitoneal, Intrathoracic or Supringuinal Vascular Procedures] : High Risk Surgery - Intraperitoneal, Intrathoracic or Supringuinal Vascular Procedures - No (0) [Congestive Heart Failure] : Congestive Heart Failure - No (0) [Insulin-dependent Diabetic (1 Point)] : Insulin-dependent Diabetic - No (0) [Prior Cerebrovascular Accident or TIA] : Prior Cerebrovascular Accident or TIA - No (0) [Creatinine >= 2mg/dL (1 Point)] : Creatinine >= 2mg/dL - No (0) [0] : 0 , RCRI Class: I, Risk of Post-Op Cardiac Complications: 3.9%, 95% CI for Risk Estimate: 2.8% - 5.4% [Continue medications as is] : Continue current medications [Patient Optimized for Surgery] : Patient optimized for surgery [As per surgery] : as per surgery [FreeTextEntry4] : \par At this time, there are no medical contraindications for the planned surgery and anesthesia. \par \par HTN- stable, c/w Losartan- Hydrochlorothiazide \par \par Hyperlipidemia- stable, c/w Atorvastatin\par \par Hypothyroidism- stable, c/w Levothyroxine\par \par Asthma- stable, c/w Advair, Montelukast\par \par DM II- stable, c/w Tradjenta\par \par EDYTA- on CPAP\par \par Please call with any questions.\par  [FreeTextEntry6] : Hold ASA/NSAIDS 1 week prior to surgery, Hold Tradjenta on day of surgery

## 2020-10-20 ENCOUNTER — RESULT REVIEW (OUTPATIENT)
Age: 65
End: 2020-10-20

## 2020-10-21 ENCOUNTER — RX RENEWAL (OUTPATIENT)
Age: 65
End: 2020-10-21

## 2020-10-27 ENCOUNTER — RX RENEWAL (OUTPATIENT)
Age: 65
End: 2020-10-27

## 2020-10-31 ENCOUNTER — APPOINTMENT (OUTPATIENT)
Dept: FAMILY MEDICINE | Facility: CLINIC | Age: 65
End: 2020-10-31
Payer: COMMERCIAL

## 2020-10-31 VITALS
HEIGHT: 66 IN | SYSTOLIC BLOOD PRESSURE: 110 MMHG | TEMPERATURE: 97.3 F | WEIGHT: 195 LBS | HEART RATE: 74 BPM | OXYGEN SATURATION: 96 % | BODY MASS INDEX: 31.34 KG/M2 | DIASTOLIC BLOOD PRESSURE: 70 MMHG

## 2020-10-31 DIAGNOSIS — N39.0 URINARY TRACT INFECTION, SITE NOT SPECIFIED: ICD-10-CM

## 2020-10-31 LAB
BILIRUB UR QL STRIP: NEGATIVE
CLARITY UR: NORMAL
COLLECTION METHOD: NORMAL
GLUCOSE UR-MCNC: NEGATIVE
HCG UR QL: 0.2 EU/DL
HGB UR QL STRIP.AUTO: NORMAL
KETONES UR-MCNC: NORMAL
LEUKOCYTE ESTERASE UR QL STRIP: NORMAL
NITRITE UR QL STRIP: POSITIVE
PH UR STRIP: 6
PROT UR STRIP-MCNC: NORMAL
SP GR UR STRIP: 1.03

## 2020-10-31 PROCEDURE — 99213 OFFICE O/P EST LOW 20 MIN: CPT

## 2020-10-31 PROCEDURE — 81003 URINALYSIS AUTO W/O SCOPE: CPT | Mod: QW

## 2020-10-31 PROCEDURE — 99072 ADDL SUPL MATRL&STAF TM PHE: CPT

## 2020-10-31 NOTE — HISTORY OF PRESENT ILLNESS
[FreeTextEntry8] : UTI for several days. Low back pain and burn at end of void. Frequency. \par No fever or chills. Drinking fluids well.

## 2020-11-03 ENCOUNTER — TRANSCRIPTION ENCOUNTER (OUTPATIENT)
Age: 65
End: 2020-11-03

## 2020-11-03 LAB — BACTERIA UR CULT: ABNORMAL

## 2020-12-09 ENCOUNTER — RX RENEWAL (OUTPATIENT)
Age: 65
End: 2020-12-09

## 2020-12-17 ENCOUNTER — RX RENEWAL (OUTPATIENT)
Age: 65
End: 2020-12-17

## 2020-12-23 PROBLEM — N39.0 ACUTE UTI: Status: RESOLVED | Noted: 2020-10-31 | Resolved: 2020-12-23

## 2021-01-08 ENCOUNTER — RX RENEWAL (OUTPATIENT)
Age: 66
End: 2021-01-08

## 2021-02-02 ENCOUNTER — RX RENEWAL (OUTPATIENT)
Age: 66
End: 2021-02-02

## 2021-03-01 ENCOUNTER — RX RENEWAL (OUTPATIENT)
Age: 66
End: 2021-03-01

## 2021-04-26 ENCOUNTER — NON-APPOINTMENT (OUTPATIENT)
Age: 66
End: 2021-04-26

## 2021-04-27 ENCOUNTER — NON-APPOINTMENT (OUTPATIENT)
Age: 66
End: 2021-04-27

## 2021-04-27 ENCOUNTER — APPOINTMENT (OUTPATIENT)
Dept: FAMILY MEDICINE | Facility: CLINIC | Age: 66
End: 2021-04-27
Payer: COMMERCIAL

## 2021-04-27 VITALS
HEIGHT: 66 IN | SYSTOLIC BLOOD PRESSURE: 120 MMHG | DIASTOLIC BLOOD PRESSURE: 70 MMHG | HEART RATE: 67 BPM | BODY MASS INDEX: 32.3 KG/M2 | TEMPERATURE: 97.3 F | WEIGHT: 201 LBS | OXYGEN SATURATION: 96 %

## 2021-04-27 DIAGNOSIS — J30.2 OTHER SEASONAL ALLERGIC RHINITIS: ICD-10-CM

## 2021-04-27 PROCEDURE — 93000 ELECTROCARDIOGRAM COMPLETE: CPT

## 2021-04-27 PROCEDURE — 36415 COLL VENOUS BLD VENIPUNCTURE: CPT

## 2021-04-27 PROCEDURE — 99072 ADDL SUPL MATRL&STAF TM PHE: CPT

## 2021-04-27 PROCEDURE — 99396 PREV VISIT EST AGE 40-64: CPT | Mod: 25

## 2021-04-27 RX ORDER — CIPROFLOXACIN HYDROCHLORIDE 250 MG/1
250 TABLET, FILM COATED ORAL
Qty: 10 | Refills: 0 | Status: DISCONTINUED | COMMUNITY
Start: 2020-10-31 | End: 2021-04-27

## 2021-04-27 RX ORDER — OLOPATADINE HCL 1 MG/ML
0.1 SOLUTION/ DROPS OPHTHALMIC TWICE DAILY
Qty: 3 | Refills: 2 | Status: DISCONTINUED | COMMUNITY
Start: 2017-04-03 | End: 2021-04-27

## 2021-04-27 NOTE — PHYSICAL EXAM
[Normal Sclera/Conjunctiva] : normal sclera/conjunctiva [Rounded] : rounded [Soft, Nontender] : the abdomen was soft and nontender [Abdomen Hernia Ventral] : a ventral hernia was present [No Focal Deficits] : no focal deficits [Normal] : affect was normal and insight and judgment were intact

## 2021-04-27 NOTE — PLAN
[FreeTextEntry1] : Will see endocrine as scheduled in summer.\par Medications refilled. \par FU in six months with Dr Mariano.

## 2021-04-27 NOTE — HISTORY OF PRESENT ILLNESS
[de-identified] : Annual CPE\par Assessment of asthma, HTN, hyperlipidemia, hypothyroid, (sees endocrine but missed with COVID)\par Checks glucose daily, not checking BP\par Seasonal allergies, taking eye drops , antihistamine and steroid nasal spray. \par Needs all medications refilled and tolerating all medications. \par Up to date with mammogram

## 2021-04-29 LAB
ALBUMIN SERPL ELPH-MCNC: 4.7 G/DL
ALP BLD-CCNC: 84 U/L
ALT SERPL-CCNC: 32 U/L
ANION GAP SERPL CALC-SCNC: 19 MMOL/L
APPEARANCE: CLEAR
AST SERPL-CCNC: 29 U/L
BACTERIA: NEGATIVE
BASOPHILS # BLD AUTO: 0.05 K/UL
BASOPHILS NFR BLD AUTO: 1 %
BILIRUB SERPL-MCNC: 1 MG/DL
BILIRUBIN URINE: NEGATIVE
BLOOD URINE: NEGATIVE
BUN SERPL-MCNC: 19 MG/DL
CALCIUM SERPL-MCNC: 9.8 MG/DL
CHLORIDE SERPL-SCNC: 102 MMOL/L
CHOLEST SERPL-MCNC: 209 MG/DL
CO2 SERPL-SCNC: 22 MMOL/L
COLOR: NORMAL
CREAT SERPL-MCNC: 0.85 MG/DL
CREAT SPEC-SCNC: 93 MG/DL
EOSINOPHIL # BLD AUTO: 0.07 K/UL
EOSINOPHIL NFR BLD AUTO: 1.4 %
ESTIMATED AVERAGE GLUCOSE: 126 MG/DL
GLUCOSE QUALITATIVE U: NEGATIVE
GLUCOSE SERPL-MCNC: 99 MG/DL
HBA1C MFR BLD HPLC: 6 %
HCT VFR BLD CALC: 42.4 %
HDLC SERPL-MCNC: 63 MG/DL
HGB BLD-MCNC: 13.9 G/DL
HYALINE CASTS: 1 /LPF
IMM GRANULOCYTES NFR BLD AUTO: 0.2 %
KETONES URINE: NEGATIVE
LDLC SERPL CALC-MCNC: 109 MG/DL
LEUKOCYTE ESTERASE URINE: ABNORMAL
LYMPHOCYTES # BLD AUTO: 1.61 K/UL
LYMPHOCYTES NFR BLD AUTO: 32.5 %
MAN DIFF?: NORMAL
MCHC RBC-ENTMCNC: 30.9 PG
MCHC RBC-ENTMCNC: 32.8 GM/DL
MCV RBC AUTO: 94.2 FL
MICROALBUMIN 24H UR DL<=1MG/L-MCNC: <1.2 MG/DL
MICROALBUMIN/CREAT 24H UR-RTO: NORMAL MG/G
MICROSCOPIC-UA: NORMAL
MONOCYTES # BLD AUTO: 0.52 K/UL
MONOCYTES NFR BLD AUTO: 10.5 %
NEUTROPHILS # BLD AUTO: 2.69 K/UL
NEUTROPHILS NFR BLD AUTO: 54.4 %
NITRITE URINE: NEGATIVE
NONHDLC SERPL-MCNC: 147 MG/DL
PH URINE: 7
PLATELET # BLD AUTO: 239 K/UL
POTASSIUM SERPL-SCNC: 3.9 MMOL/L
PROT SERPL-MCNC: 7.2 G/DL
PROTEIN URINE: NEGATIVE
RBC # BLD: 4.5 M/UL
RBC # FLD: 12.6 %
RED BLOOD CELLS URINE: 4 /HPF
SODIUM SERPL-SCNC: 143 MMOL/L
SPECIFIC GRAVITY URINE: 1.02
SQUAMOUS EPITHELIAL CELLS: 2 /HPF
TRIGL SERPL-MCNC: 191 MG/DL
TSH SERPL-ACNC: 0.58 UIU/ML
UROBILINOGEN URINE: NORMAL
WBC # FLD AUTO: 4.95 K/UL
WHITE BLOOD CELLS URINE: 2 /HPF

## 2021-10-27 ENCOUNTER — RX RENEWAL (OUTPATIENT)
Age: 66
End: 2021-10-27

## 2021-12-27 ENCOUNTER — RX RENEWAL (OUTPATIENT)
Age: 66
End: 2021-12-27

## 2021-12-28 ENCOUNTER — RX RENEWAL (OUTPATIENT)
Age: 66
End: 2021-12-28

## 2022-01-06 ENCOUNTER — APPOINTMENT (OUTPATIENT)
Dept: FAMILY MEDICINE | Facility: CLINIC | Age: 67
End: 2022-01-06
Payer: COMMERCIAL

## 2022-01-06 VITALS
HEIGHT: 66 IN | HEART RATE: 83 BPM | BODY MASS INDEX: 30.05 KG/M2 | DIASTOLIC BLOOD PRESSURE: 80 MMHG | WEIGHT: 187 LBS | OXYGEN SATURATION: 97 % | TEMPERATURE: 97 F | SYSTOLIC BLOOD PRESSURE: 126 MMHG

## 2022-01-06 VITALS — DIASTOLIC BLOOD PRESSURE: 80 MMHG | HEART RATE: 72 BPM | RESPIRATION RATE: 16 BRPM | SYSTOLIC BLOOD PRESSURE: 130 MMHG

## 2022-01-06 DIAGNOSIS — K64.9 UNSPECIFIED HEMORRHOIDS: ICD-10-CM

## 2022-01-06 PROCEDURE — 99214 OFFICE O/P EST MOD 30 MIN: CPT | Mod: 25

## 2022-01-06 PROCEDURE — 36415 COLL VENOUS BLD VENIPUNCTURE: CPT

## 2022-01-06 RX ORDER — CELECOXIB 100 MG/1
100 CAPSULE ORAL TWICE DAILY
Qty: 60 | Refills: 0 | Status: COMPLETED | COMMUNITY
Start: 2020-09-29 | End: 2022-01-06

## 2022-01-06 RX ORDER — LINAGLIPTIN 5 MG/1
5 TABLET, FILM COATED ORAL
Qty: 90 | Refills: 1 | Status: COMPLETED | COMMUNITY
Start: 2017-03-01 | End: 2022-01-06

## 2022-01-06 NOTE — PHYSICAL EXAM
[No Acute Distress] : no acute distress [PERRL] : pupils equal round and reactive to light [Normal TMs] : both tympanic membranes were normal [Supple] : supple [Clear to Auscultation] : lungs were clear to auscultation bilaterally [No Edema] : there was no peripheral edema [Normal] : no rash [Normal Gait] : normal gait [Normal Affect] : the affect was normal

## 2022-01-06 NOTE — ASSESSMENT
[FreeTextEntry1] : asthma  continue singulair and advair hrn bp well controlled losartan  depression in remission and lexapro hld continue atorvastatin  hypothyroid continue  synthroid dm continue  trulicity

## 2022-01-06 NOTE — HISTORY OF PRESENT ILLNESS
[Asthma] : Asthma [Diabetes Mellitus] : Diabetes Mellitus [Hyperlipidemia] : Hyperlipidemia [Hypertension] : Hypertension [Other: ___] : [unfilled]: [Check glucose ___ x/week] : Patient checks blood glucose [unfilled]  times a week [Target goal met] : A1C target goal met [Checks BP Sporadically] : The patient checks ~his/her~ blood pressure sporadically [<130/90] : Target blood pressure is  <130/90 [Doing Well] : Patient is doing well [Managed with medications] : managed with  medication [High Intensity therapy] : Patient is currently on high intensity statin therapy [Intermittent] : intermittent [None] : No exacerbating factors are noted [Well controlled] : Condition is well controlled [No New Symptoms] : Patient denies any new symptoms [Stable] : Overall status has been stable [FreeTextEntry6] : feeling well  [EyeExamDate] : 6.0 [FreeTextEntry1] : taking  sythroid

## 2022-01-07 LAB
ALBUMIN SERPL ELPH-MCNC: 4.7 G/DL
ALP BLD-CCNC: 79 U/L
ALT SERPL-CCNC: 35 U/L
ANION GAP SERPL CALC-SCNC: 11 MMOL/L
APPEARANCE: CLEAR
AST SERPL-CCNC: 32 U/L
BACTERIA: NEGATIVE
BASOPHILS # BLD AUTO: 0.05 K/UL
BASOPHILS NFR BLD AUTO: 0.9 %
BILIRUB SERPL-MCNC: 1 MG/DL
BILIRUBIN URINE: NEGATIVE
BLOOD URINE: NEGATIVE
BUN SERPL-MCNC: 15 MG/DL
CALCIUM SERPL-MCNC: 10.1 MG/DL
CHLORIDE SERPL-SCNC: 103 MMOL/L
CHOLEST SERPL-MCNC: 205 MG/DL
CO2 SERPL-SCNC: 27 MMOL/L
COLOR: YELLOW
CREAT SERPL-MCNC: 0.88 MG/DL
CREAT SPEC-SCNC: 286 MG/DL
EOSINOPHIL # BLD AUTO: 0.07 K/UL
EOSINOPHIL NFR BLD AUTO: 1.3 %
ESTIMATED AVERAGE GLUCOSE: 120 MG/DL
GLUCOSE QUALITATIVE U: NEGATIVE
GLUCOSE SERPL-MCNC: 119 MG/DL
HBA1C MFR BLD HPLC: 5.8 %
HCT VFR BLD CALC: 46.5 %
HDLC SERPL-MCNC: 67 MG/DL
HGB BLD-MCNC: 14.8 G/DL
HYALINE CASTS: 4 /LPF
IMM GRANULOCYTES NFR BLD AUTO: 0.2 %
KETONES URINE: NEGATIVE
LDLC SERPL CALC-MCNC: 115 MG/DL
LEUKOCYTE ESTERASE URINE: ABNORMAL
LYMPHOCYTES # BLD AUTO: 1.72 K/UL
LYMPHOCYTES NFR BLD AUTO: 32.5 %
MAN DIFF?: NORMAL
MCHC RBC-ENTMCNC: 31.6 PG
MCHC RBC-ENTMCNC: 31.8 GM/DL
MCV RBC AUTO: 99.1 FL
MICROALBUMIN 24H UR DL<=1MG/L-MCNC: 2.3 MG/DL
MICROALBUMIN/CREAT 24H UR-RTO: 8 MG/G
MICROSCOPIC-UA: NORMAL
MONOCYTES # BLD AUTO: 0.35 K/UL
MONOCYTES NFR BLD AUTO: 6.6 %
NEUTROPHILS # BLD AUTO: 3.09 K/UL
NEUTROPHILS NFR BLD AUTO: 58.5 %
NITRITE URINE: NEGATIVE
NONHDLC SERPL-MCNC: 138 MG/DL
PH URINE: 6.5
PLATELET # BLD AUTO: 277 K/UL
POTASSIUM SERPL-SCNC: 4 MMOL/L
PROT SERPL-MCNC: 7 G/DL
PROTEIN URINE: NEGATIVE
RBC # BLD: 4.69 M/UL
RBC # FLD: 13.2 %
RED BLOOD CELLS URINE: 3 /HPF
SODIUM SERPL-SCNC: 141 MMOL/L
SPECIFIC GRAVITY URINE: 1.02
SQUAMOUS EPITHELIAL CELLS: 2 /HPF
T4 SERPL-MCNC: 9.5 UG/DL
TRIGL SERPL-MCNC: 118 MG/DL
TSH SERPL-ACNC: 0.59 UIU/ML
URATE SERPL-MCNC: 4.5 MG/DL
UROBILINOGEN URINE: NORMAL
WBC # FLD AUTO: 5.29 K/UL
WHITE BLOOD CELLS URINE: 3 /HPF

## 2022-01-20 ENCOUNTER — TRANSCRIPTION ENCOUNTER (OUTPATIENT)
Age: 67
End: 2022-01-20

## 2022-01-20 LAB — HEMOCCULT STL QL IA: NEGATIVE

## 2022-02-08 ENCOUNTER — NON-APPOINTMENT (OUTPATIENT)
Age: 67
End: 2022-02-08

## 2022-02-08 ENCOUNTER — APPOINTMENT (OUTPATIENT)
Dept: FAMILY MEDICINE | Facility: CLINIC | Age: 67
End: 2022-02-08
Payer: COMMERCIAL

## 2022-02-08 VITALS
DIASTOLIC BLOOD PRESSURE: 86 MMHG | SYSTOLIC BLOOD PRESSURE: 124 MMHG | HEIGHT: 66 IN | HEART RATE: 94 BPM | OXYGEN SATURATION: 98 % | WEIGHT: 187 LBS | TEMPERATURE: 97.7 F | RESPIRATION RATE: 16 BRPM | BODY MASS INDEX: 30.05 KG/M2

## 2022-02-08 VITALS — DIASTOLIC BLOOD PRESSURE: 80 MMHG | SYSTOLIC BLOOD PRESSURE: 124 MMHG | RESPIRATION RATE: 16 BRPM | HEART RATE: 72 BPM

## 2022-02-08 DIAGNOSIS — M19.90 UNSPECIFIED OSTEOARTHRITIS, UNSPECIFIED SITE: ICD-10-CM

## 2022-02-08 DIAGNOSIS — R55 SYNCOPE AND COLLAPSE: ICD-10-CM

## 2022-02-08 DIAGNOSIS — Z23 ENCOUNTER FOR IMMUNIZATION: ICD-10-CM

## 2022-02-08 DIAGNOSIS — M77.8 OTHER ENTHESOPATHIES, NOT ELSEWHERE CLASSIFIED: ICD-10-CM

## 2022-02-08 PROCEDURE — 93000 ELECTROCARDIOGRAM COMPLETE: CPT

## 2022-02-08 PROCEDURE — 99214 OFFICE O/P EST MOD 30 MIN: CPT | Mod: 25

## 2022-02-08 NOTE — HISTORY OF PRESENT ILLNESS
[FreeTextEntry8] : syncope  pt did not eat  much on sunday took trulicity went to win[place] had 4 glasses of wine had cheese and crackers pt stood up and past out  without warning then 1/2 later started to vomit got home went to bed and slept through the night pt hit face on  glass  table pt was out for seconds woke up alert no  palps or cp

## 2022-02-08 NOTE — ASSESSMENT
[FreeTextEntry1] : syncope  related to not eating taking trulicity and losartan/hct   dm continue trulicity htn continue losatan /hct asthma continue advair lab evaluation normal 1 mo ago \par

## 2022-02-08 NOTE — PHYSICAL EXAM
[No Acute Distress] : no acute distress [PERRL] : pupils equal round and reactive to light [Normal TMs] : both tympanic membranes were normal [Supple] : supple [Clear to Auscultation] : lungs were clear to auscultation bilaterally [Regular Rhythm] : with a regular rhythm [No Edema] : there was no peripheral edema [Normal] : no posterior cervical lymphadenopathy and no anterior cervical lymphadenopathy [No Joint Swelling] : no joint swelling [de-identified] : large  area of ecchymosis  rt chin  [de-identified] : gait nl  convergence  normal serial 7  world  normal f-n normal

## 2022-10-11 ENCOUNTER — RX RENEWAL (OUTPATIENT)
Age: 67
End: 2022-10-11

## 2022-11-02 ENCOUNTER — APPOINTMENT (OUTPATIENT)
Dept: FAMILY MEDICINE | Facility: CLINIC | Age: 67
End: 2022-11-02

## 2022-11-02 VITALS
OXYGEN SATURATION: 97 % | BODY MASS INDEX: 29.89 KG/M2 | DIASTOLIC BLOOD PRESSURE: 84 MMHG | HEIGHT: 66 IN | HEART RATE: 74 BPM | SYSTOLIC BLOOD PRESSURE: 136 MMHG | WEIGHT: 186 LBS | TEMPERATURE: 97.3 F

## 2022-11-02 DIAGNOSIS — Z23 ENCOUNTER FOR IMMUNIZATION: ICD-10-CM

## 2022-11-02 DIAGNOSIS — Z00.00 ENCOUNTER FOR GENERAL ADULT MEDICAL EXAMINATION W/OUT ABNORMAL FINDINGS: ICD-10-CM

## 2022-11-02 DIAGNOSIS — E66.9 OBESITY, UNSPECIFIED: ICD-10-CM

## 2022-11-02 DIAGNOSIS — E06.3 AUTOIMMUNE THYROIDITIS: ICD-10-CM

## 2022-11-02 PROCEDURE — 99396 PREV VISIT EST AGE 40-64: CPT | Mod: 25

## 2022-11-02 PROCEDURE — 90677 PCV20 VACCINE IM: CPT

## 2022-11-02 PROCEDURE — 90662 IIV NO PRSV INCREASED AG IM: CPT

## 2022-11-02 PROCEDURE — 90472 IMMUNIZATION ADMIN EACH ADD: CPT

## 2022-11-02 PROCEDURE — G0009: CPT

## 2022-11-02 NOTE — HISTORY OF PRESENT ILLNESS
[de-identified] : Annual CPE\par Stable health - \par DM, hypothyroid, HTN, needs blood work and medication refill.\par Does not check glucose regularly.\par Asthma - needs refill of inhalers. \par \par 67 in 2 days, needs flu and pneumonia vaccine\par \par Retired, mother in nursing home, spends a lot of time with her.

## 2022-11-02 NOTE — HEALTH RISK ASSESSMENT
[Very Good] : ~his/her~  mood as very good [Former] : Former [No falls in past year] : Patient reported no falls in the past year [de-identified] : no regular workouts.  [de-identified] : diabetic diet [With Family] : lives with family [Retired] : retired [] :  [Reports changes in hearing] : Reports no changes in hearing [Reports changes in vision] : Reports no changes in vision [Reports changes in dental health] : Reports no changes in dental health

## 2022-11-03 ENCOUNTER — TRANSCRIPTION ENCOUNTER (OUTPATIENT)
Age: 67
End: 2022-11-03

## 2022-11-03 LAB
ALBUMIN SERPL ELPH-MCNC: 4.9 G/DL
ALP BLD-CCNC: 86 U/L
ALT SERPL-CCNC: 28 U/L
ANION GAP SERPL CALC-SCNC: 15 MMOL/L
APPEARANCE: ABNORMAL
AST SERPL-CCNC: 23 U/L
BACTERIA: NEGATIVE
BASOPHILS # BLD AUTO: 0.06 K/UL
BASOPHILS NFR BLD AUTO: 1 %
BILIRUB SERPL-MCNC: 0.9 MG/DL
BILIRUBIN URINE: NEGATIVE
BLOOD URINE: NEGATIVE
BUN SERPL-MCNC: 12 MG/DL
CALCIUM SERPL-MCNC: 10.5 MG/DL
CHLORIDE SERPL-SCNC: 101 MMOL/L
CHOLEST SERPL-MCNC: 223 MG/DL
CO2 SERPL-SCNC: 26 MMOL/L
COLOR: YELLOW
CREAT SERPL-MCNC: 0.88 MG/DL
CREAT SPEC-SCNC: 123 MG/DL
EGFR: 73 ML/MIN/1.73M2
EOSINOPHIL # BLD AUTO: 0.1 K/UL
EOSINOPHIL NFR BLD AUTO: 1.7 %
ESTIMATED AVERAGE GLUCOSE: 120 MG/DL
GLUCOSE QUALITATIVE U: NEGATIVE
GLUCOSE SERPL-MCNC: 89 MG/DL
HBA1C MFR BLD HPLC: 5.8 %
HCT VFR BLD CALC: 47.5 %
HDLC SERPL-MCNC: 80 MG/DL
HGB BLD-MCNC: 15.6 G/DL
HYALINE CASTS: 1 /LPF
IMM GRANULOCYTES NFR BLD AUTO: 0.3 %
KETONES URINE: NEGATIVE
LDLC SERPL CALC-MCNC: 119 MG/DL
LEUKOCYTE ESTERASE URINE: NEGATIVE
LYMPHOCYTES # BLD AUTO: 2.07 K/UL
LYMPHOCYTES NFR BLD AUTO: 34.7 %
MAN DIFF?: NORMAL
MCHC RBC-ENTMCNC: 32 PG
MCHC RBC-ENTMCNC: 32.8 GM/DL
MCV RBC AUTO: 97.3 FL
MICROALBUMIN 24H UR DL<=1MG/L-MCNC: <1.2 MG/DL
MICROALBUMIN/CREAT 24H UR-RTO: NORMAL MG/G
MICROSCOPIC-UA: NORMAL
MONOCYTES # BLD AUTO: 0.45 K/UL
MONOCYTES NFR BLD AUTO: 7.5 %
NEUTROPHILS # BLD AUTO: 3.27 K/UL
NEUTROPHILS NFR BLD AUTO: 54.8 %
NITRITE URINE: NEGATIVE
NONHDLC SERPL-MCNC: 143 MG/DL
PH URINE: 7
PLATELET # BLD AUTO: 289 K/UL
POTASSIUM SERPL-SCNC: 3.9 MMOL/L
PROT SERPL-MCNC: 7.2 G/DL
PROTEIN URINE: NORMAL
RBC # BLD: 4.88 M/UL
RBC # FLD: 13.2 %
RED BLOOD CELLS URINE: 3 /HPF
SODIUM SERPL-SCNC: 142 MMOL/L
SPECIFIC GRAVITY URINE: 1.01
SQUAMOUS EPITHELIAL CELLS: 1 /HPF
TRIGL SERPL-MCNC: 118 MG/DL
TSH SERPL-ACNC: 1.01 UIU/ML
UROBILINOGEN URINE: NORMAL
WBC # FLD AUTO: 5.97 K/UL
WHITE BLOOD CELLS URINE: 0 /HPF

## 2023-02-01 ENCOUNTER — RX RENEWAL (OUTPATIENT)
Age: 68
End: 2023-02-01

## 2023-02-03 ENCOUNTER — RX RENEWAL (OUTPATIENT)
Age: 68
End: 2023-02-03

## 2023-04-30 ENCOUNTER — RX RENEWAL (OUTPATIENT)
Age: 68
End: 2023-04-30

## 2023-05-16 NOTE — HEALTH RISK ASSESSMENT
[Very Good] : ~his/her~  mood as very good [] : No [None] : None [With Family] : lives with family [Employed] : employed [] :  [# Of Children ___] : has [unfilled] children [Reports changes in hearing] : Reports no changes in hearing [Reports changes in vision] : Reports no changes in vision [Reports changes in dental health] : Reports no changes in dental health [Smoke Detector] : smoke detector [Seat Belt] :  uses seat belt [Sunscreen] : uses sunscreen [FreeTextEntry2] :  [de-identified] : annual eye exam [phone]

## 2023-05-23 ENCOUNTER — RX RENEWAL (OUTPATIENT)
Age: 68
End: 2023-05-23

## 2023-06-20 ENCOUNTER — APPOINTMENT (OUTPATIENT)
Dept: FAMILY MEDICINE | Facility: CLINIC | Age: 68
End: 2023-06-20
Payer: COMMERCIAL

## 2023-06-20 VITALS
HEIGHT: 66 IN | OXYGEN SATURATION: 96 % | WEIGHT: 191 LBS | HEART RATE: 98 BPM | SYSTOLIC BLOOD PRESSURE: 122 MMHG | TEMPERATURE: 97.1 F | DIASTOLIC BLOOD PRESSURE: 82 MMHG | BODY MASS INDEX: 30.7 KG/M2

## 2023-06-20 VITALS — SYSTOLIC BLOOD PRESSURE: 130 MMHG | HEART RATE: 88 BPM | DIASTOLIC BLOOD PRESSURE: 80 MMHG | RESPIRATION RATE: 16 BRPM

## 2023-06-20 PROCEDURE — 99214 OFFICE O/P EST MOD 30 MIN: CPT

## 2023-06-20 NOTE — HISTORY OF PRESENT ILLNESS
[Asthma] : Asthma [Diabetes Mellitus] : Diabetes Mellitus [Hyperlipidemia] : Hyperlipidemia [Hypertension] : Hypertension [No episodes] : No hypoglycemic episodes since the last visit. [Does not check] : Patient does not check blood glucose regularly [Does not check BP] : The patient is not checking blood pressure [Doing Well] : Patient is doing well [High Intensity therapy] : Patient is currently on high intensity statin therapy [Intermittent] : intermittent [Well controlled] : Condition is well controlled [FreeTextEntry6] : pt here for management of M  HTN  HLD  ANS DEPRESSION AND ASTHMA  [EyeExamDate] : 5.8  [FreeTextEntry1] : DM CONTINUE TRULICITY HYPOTHYROID CONTINUE LEVOTHYROXINE BP ACCEPTABLE CONTINUE LOSARTAN ASTHMA STABLE  D[facility]  HANDS AND KNEES  [facility] lab evaluation\par

## 2023-06-20 NOTE — PHYSICAL EXAM
[No Acute Distress] : no acute distress [PERRL] : pupils equal round and reactive to light [Normal TMs] : both tympanic membranes were normal [Supple] : supple [Clear to Auscultation] : lungs were clear to auscultation bilaterally [Normal Rate] : normal rate  [No Murmur] : no murmur heard [No Edema] : there was no peripheral edema [Normal] : soft, non-tender, non-distended, no masses palpated, no HSM and normal bowel sounds [Normal Supraclavicular Nodes] : no supraclavicular lymphadenopathy [Normal Posterior Cervical Nodes] : no posterior cervical lymphadenopathy [Normal Anterior Cervical Nodes] : no anterior cervical lymphadenopathy [No Rash] : no rash [No Focal Deficits] : no focal deficits [Normal Affect] : the affect was normal [de-identified] : ASWTENDER  RT ISCHEAL TUBEROSITY

## 2023-06-20 NOTE — ASSESSMENT
[FreeTextEntry1] : ASTHMA STABLE CONTINUE ADVAIR HYPOTHYROID CONTINUE SYNTHROID DM CONTINUE TRULICITY HTN CONTINUE LOSARTAN lab evaluation  D[facility] KNEE [facility] \par

## 2023-06-21 ENCOUNTER — TRANSCRIPTION ENCOUNTER (OUTPATIENT)
Age: 68
End: 2023-06-21

## 2023-06-21 LAB
ALBUMIN SERPL ELPH-MCNC: 4.7 G/DL
ALP BLD-CCNC: 75 U/L
ALT SERPL-CCNC: 31 U/L
ANION GAP SERPL CALC-SCNC: 14 MMOL/L
APPEARANCE: CLEAR
AST SERPL-CCNC: 29 U/L
BACTERIA: NEGATIVE /HPF
BILIRUB SERPL-MCNC: 0.9 MG/DL
BILIRUBIN URINE: NEGATIVE
BLOOD URINE: NEGATIVE
BUN SERPL-MCNC: 13 MG/DL
CALCIUM SERPL-MCNC: 10.1 MG/DL
CAST: 0 /LPF
CHLORIDE SERPL-SCNC: 104 MMOL/L
CHOLEST SERPL-MCNC: 206 MG/DL
CO2 SERPL-SCNC: 26 MMOL/L
COLOR: YELLOW
CREAT SERPL-MCNC: 0.97 MG/DL
CREAT SPEC-SCNC: 275 MG/DL
EGFR: 65 ML/MIN/1.73M2
EPITHELIAL CELLS: 1 /HPF
ESTIMATED AVERAGE GLUCOSE: 123 MG/DL
GLUCOSE QUALITATIVE U: NEGATIVE MG/DL
GLUCOSE SERPL-MCNC: 100 MG/DL
HBA1C MFR BLD HPLC: 5.9 %
HDLC SERPL-MCNC: 68 MG/DL
KETONES URINE: ABNORMAL MG/DL
LDLC SERPL CALC-MCNC: 100 MG/DL
LEUKOCYTE ESTERASE URINE: NEGATIVE
MICROALBUMIN 24H UR DL<=1MG/L-MCNC: 1.8 MG/DL
MICROALBUMIN/CREAT 24H UR-RTO: 6 MG/G
MICROSCOPIC-UA: NORMAL
NITRITE URINE: NEGATIVE
NONHDLC SERPL-MCNC: 138 MG/DL
PH URINE: 6.5
POTASSIUM SERPL-SCNC: 4 MMOL/L
PROT SERPL-MCNC: 6.9 G/DL
PROTEIN URINE: NORMAL MG/DL
RED BLOOD CELLS URINE: 1 /HPF
SODIUM SERPL-SCNC: 145 MMOL/L
SPECIFIC GRAVITY URINE: 1.02
T4 SERPL-MCNC: 9.7 UG/DL
TRIGL SERPL-MCNC: 192 MG/DL
TSH SERPL-ACNC: 0.39 UIU/ML
URATE SERPL-MCNC: 5.2 MG/DL
UROBILINOGEN URINE: 0.2 MG/DL
WHITE BLOOD CELLS URINE: 1 /HPF

## 2023-08-08 ENCOUNTER — RX RENEWAL (OUTPATIENT)
Age: 68
End: 2023-08-08

## 2023-08-27 ENCOUNTER — RX RENEWAL (OUTPATIENT)
Age: 68
End: 2023-08-27

## 2023-09-07 ENCOUNTER — RESULT REVIEW (OUTPATIENT)
Age: 68
End: 2023-09-07

## 2023-09-07 ENCOUNTER — APPOINTMENT (OUTPATIENT)
Dept: FAMILY MEDICINE | Facility: CLINIC | Age: 68
End: 2023-09-07
Payer: COMMERCIAL

## 2023-09-07 VITALS
WEIGHT: 194 LBS | TEMPERATURE: 97.7 F | OXYGEN SATURATION: 97 % | BODY MASS INDEX: 31.18 KG/M2 | HEIGHT: 66 IN | SYSTOLIC BLOOD PRESSURE: 120 MMHG | DIASTOLIC BLOOD PRESSURE: 70 MMHG | HEART RATE: 99 BPM

## 2023-09-07 VITALS — RESPIRATION RATE: 16 BRPM | DIASTOLIC BLOOD PRESSURE: 84 MMHG | HEART RATE: 88 BPM | SYSTOLIC BLOOD PRESSURE: 120 MMHG

## 2023-09-07 DIAGNOSIS — S30.0XXA CONTUSION OF LOWER BACK AND PELVIS, INITIAL ENCOUNTER: ICD-10-CM

## 2023-09-07 DIAGNOSIS — J45.909 UNSPECIFIED ASTHMA, UNCOMPLICATED: ICD-10-CM

## 2023-09-07 PROCEDURE — 99214 OFFICE O/P EST MOD 30 MIN: CPT | Mod: 25

## 2023-09-07 PROCEDURE — 90662 IIV NO PRSV INCREASED AG IM: CPT

## 2023-09-07 PROCEDURE — G0008: CPT

## 2023-09-07 NOTE — PHYSICAL EXAM
[No Acute Distress] : no acute distress [PERRL] : pupils equal round and reactive to light [Normal TMs] : both tympanic membranes were normal [Supple] : supple [Clear to Auscultation] : lungs were clear to auscultation bilaterally [Normal Rate] : normal rate  [No Murmur] : no murmur heard [No Edema] : there was no peripheral edema [Normal] : soft, non-tender, non-distended, no masses palpated, no HSM and normal bowel sounds [Normal Supraclavicular Nodes] : no supraclavicular lymphadenopathy [Normal Posterior Cervical Nodes] : no posterior cervical lymphadenopathy [Normal Anterior Cervical Nodes] : no anterior cervical lymphadenopathy [No Rash] : no rash [No Focal Deficits] : no focal deficits [Normal Affect] : the affect was normal [de-identified] : tenderness over coccyx with step off

## 2023-09-07 NOTE — ASSESSMENT
[FreeTextEntry1] : coccyx  x-ray and tramadol  dm well controlled continue trullicity hld continue atorvastatin  bp acceptable continue losartan

## 2023-09-07 NOTE — HISTORY OF PRESENT ILLNESS
[Asthma] : Asthma [Hyperlipidemia] : Hyperlipidemia [Hypertension] : Hypertension [Other: ___] : [unfilled]: [FreeTextEntry6] : pt fell 1 wk ago and injure  coccyx  [Most Recent A1C: ___] : Most recent A1C was [unfilled] [Does not check BP] : The patient is not checking blood pressure [Doing Well] : Patient is doing well [Managed with medications] : managed with  medication [High Intensity therapy] : Patient is currently on high intensity statin therapy [Intermittent] : intermittent [Well controlled] : Condition is well controlled

## 2023-09-13 ENCOUNTER — RESULT REVIEW (OUTPATIENT)
Age: 68
End: 2023-09-13

## 2023-09-13 ENCOUNTER — APPOINTMENT (OUTPATIENT)
Dept: RADIOLOGY | Facility: CLINIC | Age: 68
End: 2023-09-13
Payer: COMMERCIAL

## 2023-09-13 ENCOUNTER — OUTPATIENT (OUTPATIENT)
Dept: OUTPATIENT SERVICES | Facility: HOSPITAL | Age: 68
LOS: 1 days | End: 2023-09-13
Payer: MEDICARE

## 2023-09-13 ENCOUNTER — APPOINTMENT (OUTPATIENT)
Dept: MAMMOGRAPHY | Facility: CLINIC | Age: 68
End: 2023-09-13
Payer: COMMERCIAL

## 2023-09-13 DIAGNOSIS — Z00.00 ENCOUNTER FOR GENERAL ADULT MEDICAL EXAMINATION WITHOUT ABNORMAL FINDINGS: ICD-10-CM

## 2023-09-13 DIAGNOSIS — M67.449 GANGLION, UNSPECIFIED HAND: ICD-10-CM

## 2023-09-13 DIAGNOSIS — Z90.49 ACQUIRED ABSENCE OF OTHER SPECIFIED PARTS OF DIGESTIVE TRACT: Chronic | ICD-10-CM

## 2023-09-13 DIAGNOSIS — S99.929S UNSPECIFIED INJURY OF UNSPECIFIED FOOT, SEQUELA: Chronic | ICD-10-CM

## 2023-09-13 PROCEDURE — 77063 BREAST TOMOSYNTHESIS BI: CPT | Mod: 26

## 2023-09-13 PROCEDURE — 72100 X-RAY EXAM L-S SPINE 2/3 VWS: CPT | Mod: 26

## 2023-09-13 PROCEDURE — 72220 X-RAY EXAM SACRUM TAILBONE: CPT

## 2023-09-13 PROCEDURE — 77067 SCR MAMMO BI INCL CAD: CPT | Mod: 26

## 2023-09-13 PROCEDURE — 77063 BREAST TOMOSYNTHESIS BI: CPT

## 2023-09-13 PROCEDURE — 72100 X-RAY EXAM L-S SPINE 2/3 VWS: CPT

## 2023-09-13 PROCEDURE — 77067 SCR MAMMO BI INCL CAD: CPT

## 2023-09-13 PROCEDURE — 72220 X-RAY EXAM SACRUM TAILBONE: CPT | Mod: 26

## 2023-12-05 ENCOUNTER — APPOINTMENT (OUTPATIENT)
Dept: FAMILY MEDICINE | Facility: CLINIC | Age: 68
End: 2023-12-05
Payer: MEDICARE

## 2023-12-05 ENCOUNTER — NON-APPOINTMENT (OUTPATIENT)
Age: 68
End: 2023-12-05

## 2023-12-05 VITALS
HEIGHT: 66 IN | DIASTOLIC BLOOD PRESSURE: 84 MMHG | OXYGEN SATURATION: 96 % | TEMPERATURE: 97.8 F | BODY MASS INDEX: 31.02 KG/M2 | WEIGHT: 193 LBS | HEART RATE: 92 BPM | SYSTOLIC BLOOD PRESSURE: 132 MMHG

## 2023-12-05 VITALS — DIASTOLIC BLOOD PRESSURE: 80 MMHG | HEART RATE: 72 BPM | RESPIRATION RATE: 16 BRPM | SYSTOLIC BLOOD PRESSURE: 130 MMHG

## 2023-12-05 DIAGNOSIS — I10 ESSENTIAL (PRIMARY) HYPERTENSION: ICD-10-CM

## 2023-12-05 DIAGNOSIS — E78.5 HYPERLIPIDEMIA, UNSPECIFIED: ICD-10-CM

## 2023-12-05 DIAGNOSIS — E11.9 TYPE 2 DIABETES MELLITUS W/OUT COMPLICATIONS: ICD-10-CM

## 2023-12-05 DIAGNOSIS — K21.00 GASTRO-ESOPHAGEAL REFLUX DISEASE WITH ESOPHAGITIS, WITHOUT BLEEDING: ICD-10-CM

## 2023-12-05 DIAGNOSIS — G47.33 OBSTRUCTIVE SLEEP APNEA (ADULT) (PEDIATRIC): ICD-10-CM

## 2023-12-05 DIAGNOSIS — E03.9 HYPOTHYROIDISM, UNSPECIFIED: ICD-10-CM

## 2023-12-05 DIAGNOSIS — R11.0 NAUSEA: ICD-10-CM

## 2023-12-05 DIAGNOSIS — F32.A DEPRESSION, UNSPECIFIED: ICD-10-CM

## 2023-12-05 PROCEDURE — G0403: CPT

## 2023-12-05 PROCEDURE — 99214 OFFICE O/P EST MOD 30 MIN: CPT | Mod: 25

## 2023-12-05 PROCEDURE — 99497 ADVNCD CARE PLAN 30 MIN: CPT | Mod: 25

## 2023-12-05 PROCEDURE — G0402 INITIAL PREVENTIVE EXAM: CPT

## 2023-12-05 RX ORDER — OMEPRAZOLE 20 MG/1
20 CAPSULE, DELAYED RELEASE ORAL
Qty: 90 | Refills: 1 | Status: ACTIVE | COMMUNITY
Start: 2023-12-05 | End: 1900-01-01

## 2023-12-05 RX ORDER — MELOXICAM 15 MG/1
15 TABLET ORAL
Qty: 30 | Refills: 1 | Status: ACTIVE | COMMUNITY
Start: 2023-06-20 | End: 1900-01-01

## 2023-12-05 RX ORDER — FLUTICASONE PROPIONATE 50 UG/1
50 SPRAY, METERED NASAL
Qty: 3 | Refills: 3 | Status: ACTIVE | COMMUNITY
Start: 2018-08-02 | End: 1900-01-01

## 2023-12-05 RX ORDER — TRAMADOL HYDROCHLORIDE 50 MG/1
50 TABLET, COATED ORAL
Qty: 40 | Refills: 0 | Status: COMPLETED | COMMUNITY
Start: 2023-09-07 | End: 2023-12-05

## 2023-12-05 RX ORDER — ONDANSETRON 4 MG/1
4 TABLET ORAL EVERY 6 HOURS
Qty: 30 | Refills: 1 | Status: ACTIVE | COMMUNITY
Start: 2023-09-07 | End: 1900-01-01

## 2023-12-05 RX ORDER — ALBUTEROL SULFATE 2.5 MG/3ML
(2.5 MG/3ML) SOLUTION RESPIRATORY (INHALATION)
Qty: 1 | Refills: 2 | Status: ACTIVE | COMMUNITY
Start: 2017-01-28 | End: 1900-01-01

## 2023-12-05 RX ORDER — LOSARTAN POTASSIUM AND HYDROCHLOROTHIAZIDE 12.5; 1 MG/1; MG/1
100-12.5 TABLET ORAL
Qty: 90 | Refills: 3 | Status: ACTIVE | COMMUNITY
Start: 2020-05-06 | End: 1900-01-01

## 2023-12-05 RX ORDER — LEVOTHYROXINE SODIUM 0.09 MG/1
88 TABLET ORAL
Qty: 90 | Refills: 3 | Status: ACTIVE | COMMUNITY
Start: 2016-09-30 | End: 1900-01-01

## 2023-12-05 RX ORDER — DULAGLUTIDE 1.5 MG/.5ML
1.5 INJECTION, SOLUTION SUBCUTANEOUS
Qty: 1 | Refills: 1 | Status: ACTIVE | COMMUNITY
Start: 2022-01-06

## 2023-12-05 RX ORDER — MECLIZINE HYDROCHLORIDE 12.5 MG/1
12.5 TABLET ORAL 3 TIMES DAILY
Qty: 60 | Refills: 0 | Status: ACTIVE | COMMUNITY
Start: 1900-01-01 | End: 1900-01-01

## 2023-12-06 LAB
ALBUMIN SERPL ELPH-MCNC: 4.8 G/DL
ALP BLD-CCNC: 82 U/L
ALT SERPL-CCNC: 38 U/L
ANION GAP SERPL CALC-SCNC: 10 MMOL/L
AST SERPL-CCNC: 26 U/L
BASOPHILS # BLD AUTO: 0.07 K/UL
BASOPHILS NFR BLD AUTO: 0.8 %
BILIRUB SERPL-MCNC: 0.7 MG/DL
BUN SERPL-MCNC: 14 MG/DL
CALCIUM SERPL-MCNC: 10.1 MG/DL
CHLORIDE SERPL-SCNC: 105 MMOL/L
CHOLEST SERPL-MCNC: 176 MG/DL
CO2 SERPL-SCNC: 27 MMOL/L
CREAT SERPL-MCNC: 0.89 MG/DL
CREAT SPEC-SCNC: 202 MG/DL
EGFR: 71 ML/MIN/1.73M2
EOSINOPHIL # BLD AUTO: 0.07 K/UL
EOSINOPHIL NFR BLD AUTO: 0.8 %
ESTIMATED AVERAGE GLUCOSE: 114 MG/DL
FSH SERPL-MCNC: 45.8 IU/L
GLUCOSE SERPL-MCNC: 71 MG/DL
HBA1C MFR BLD HPLC: 5.6 %
HCT VFR BLD CALC: 43 %
HDLC SERPL-MCNC: 68 MG/DL
HGB BLD-MCNC: 13.8 G/DL
IMM GRANULOCYTES NFR BLD AUTO: 0.2 %
LDLC SERPL CALC-MCNC: 79 MG/DL
LH SERPL-ACNC: 20 IU/L
LYMPHOCYTES # BLD AUTO: 2.27 K/UL
LYMPHOCYTES NFR BLD AUTO: 25.9 %
MAN DIFF?: NORMAL
MCHC RBC-ENTMCNC: 32 PG
MCHC RBC-ENTMCNC: 32.1 GM/DL
MCV RBC AUTO: 99.8 FL
MICROALBUMIN 24H UR DL<=1MG/L-MCNC: 11.5 MG/DL
MICROALBUMIN/CREAT 24H UR-RTO: 57 MG/G
MONOCYTES # BLD AUTO: 0.79 K/UL
MONOCYTES NFR BLD AUTO: 9 %
NEUTROPHILS # BLD AUTO: 5.55 K/UL
NEUTROPHILS NFR BLD AUTO: 63.3 %
NONHDLC SERPL-MCNC: 108 MG/DL
PLATELET # BLD AUTO: 261 K/UL
POTASSIUM SERPL-SCNC: 3.8 MMOL/L
PROT SERPL-MCNC: 7.2 G/DL
RBC # BLD: 4.31 M/UL
RBC # FLD: 13.3 %
SODIUM SERPL-SCNC: 142 MMOL/L
T4 SERPL-MCNC: 9.9 UG/DL
TRIGL SERPL-MCNC: 173 MG/DL
TSH SERPL-ACNC: 0.72 UIU/ML
URATE SERPL-MCNC: 4.9 MG/DL
WBC # FLD AUTO: 8.77 K/UL

## 2023-12-07 DIAGNOSIS — N39.0 URINARY TRACT INFECTION, SITE NOT SPECIFIED: ICD-10-CM

## 2023-12-07 LAB
APPEARANCE: ABNORMAL
BACTERIA: ABNORMAL /HPF
BILIRUBIN URINE: NEGATIVE
BLOOD URINE: ABNORMAL
CAST: 2 /LPF
COLOR: YELLOW
EPITHELIAL CELLS: 2 /HPF
GLUCOSE QUALITATIVE U: NEGATIVE MG/DL
KETONES URINE: NEGATIVE MG/DL
LEUKOCYTE ESTERASE URINE: ABNORMAL
MICROSCOPIC-UA: NORMAL
NITRITE URINE: POSITIVE
PH URINE: 6.5
PROTEIN URINE: 30 MG/DL
RED BLOOD CELLS URINE: 7 /HPF
SPECIFIC GRAVITY URINE: 1.02
UROBILINOGEN URINE: 0.2 MG/DL
WHITE BLOOD CELLS URINE: 119 /HPF

## 2023-12-07 RX ORDER — NITROFURANTOIN (MONOHYDRATE/MACROCRYSTALS) 25; 75 MG/1; MG/1
100 CAPSULE ORAL
Qty: 10 | Refills: 0 | Status: ACTIVE | COMMUNITY
Start: 2023-12-07 | End: 1900-01-01

## 2024-01-23 RX ORDER — FLUTICASONE PROPIONATE AND SALMETEROL 250; 50 UG/1; UG/1
250-50 POWDER RESPIRATORY (INHALATION)
Qty: 3 | Refills: 3 | Status: ACTIVE | COMMUNITY
Start: 2018-09-24 | End: 1900-01-01

## 2024-07-01 ENCOUNTER — RX CHANGE (OUTPATIENT)
Age: 69
End: 2024-07-01

## 2024-07-01 RX ORDER — LEVALBUTEROL TARTRATE 45 UG/1
45 AEROSOL, METERED ORAL
Qty: 1 | Refills: 3 | Status: ACTIVE | COMMUNITY
Start: 1900-01-01 | End: 1900-01-01

## 2024-09-16 ENCOUNTER — OUTPATIENT (OUTPATIENT)
Dept: OUTPATIENT SERVICES | Facility: HOSPITAL | Age: 69
LOS: 1 days | End: 2024-09-16
Payer: MEDICARE

## 2024-09-16 ENCOUNTER — APPOINTMENT (OUTPATIENT)
Dept: RADIOLOGY | Facility: CLINIC | Age: 69
End: 2024-09-16
Payer: MEDICARE

## 2024-09-16 ENCOUNTER — APPOINTMENT (OUTPATIENT)
Dept: FAMILY MEDICINE | Facility: CLINIC | Age: 69
End: 2024-09-16
Payer: MEDICARE

## 2024-09-16 ENCOUNTER — NON-APPOINTMENT (OUTPATIENT)
Age: 69
End: 2024-09-16

## 2024-09-16 ENCOUNTER — TRANSCRIPTION ENCOUNTER (OUTPATIENT)
Age: 69
End: 2024-09-16

## 2024-09-16 ENCOUNTER — RX RENEWAL (OUTPATIENT)
Age: 69
End: 2024-09-16

## 2024-09-16 VITALS
BODY MASS INDEX: 30.37 KG/M2 | TEMPERATURE: 97.7 F | WEIGHT: 189 LBS | OXYGEN SATURATION: 95 % | SYSTOLIC BLOOD PRESSURE: 116 MMHG | HEIGHT: 66 IN | DIASTOLIC BLOOD PRESSURE: 80 MMHG | HEART RATE: 95 BPM

## 2024-09-16 DIAGNOSIS — Z90.49 ACQUIRED ABSENCE OF OTHER SPECIFIED PARTS OF DIGESTIVE TRACT: Chronic | ICD-10-CM

## 2024-09-16 DIAGNOSIS — S99.929S UNSPECIFIED INJURY OF UNSPECIFIED FOOT, SEQUELA: Chronic | ICD-10-CM

## 2024-09-16 DIAGNOSIS — M25.561 PAIN IN RIGHT KNEE: ICD-10-CM

## 2024-09-16 PROCEDURE — 73562 X-RAY EXAM OF KNEE 3: CPT | Mod: 26,50

## 2024-09-16 PROCEDURE — 73562 X-RAY EXAM OF KNEE 3: CPT

## 2024-09-16 PROCEDURE — 99214 OFFICE O/P EST MOD 30 MIN: CPT

## 2024-09-16 RX ORDER — MELOXICAM 15 MG/1
15 TABLET ORAL
Qty: 20 | Refills: 0 | Status: ACTIVE | COMMUNITY
Start: 2024-09-16 | End: 1900-01-01

## 2024-09-18 ENCOUNTER — NON-APPOINTMENT (OUTPATIENT)
Age: 69
End: 2024-09-18

## 2024-09-18 NOTE — PHYSICAL EXAM
[Normal] : no acute distress, well nourished, well developed and well-appearing [de-identified] : b/l knee pain, +meena R knee, +lateral knee tenderness b/l, mild R knee edema

## 2024-09-18 NOTE — PHYSICAL EXAM
[Normal] : no acute distress, well nourished, well developed and well-appearing [de-identified] : b/l knee pain, +meena R knee, +lateral knee tenderness b/l, mild R knee edema

## 2024-09-18 NOTE — HISTORY OF PRESENT ILLNESS
[FreeTextEntry8] : Pt fell going onto water taxi on 9/10/24. Pt fell while boarding, R knee gave way went valgus and L knee was hyperflexed. Pt feels unstable with walking on both knees. R knee became swollen, iced both knees. Pt took advil 800mg once which helped minimally. Pain has not improved.

## 2024-09-19 ENCOUNTER — OUTPATIENT (OUTPATIENT)
Dept: OUTPATIENT SERVICES | Facility: HOSPITAL | Age: 69
LOS: 1 days | End: 2024-09-19
Payer: MEDICARE

## 2024-09-19 ENCOUNTER — APPOINTMENT (OUTPATIENT)
Dept: ULTRASOUND IMAGING | Facility: CLINIC | Age: 69
End: 2024-09-19
Payer: MEDICARE

## 2024-09-19 ENCOUNTER — RESULT REVIEW (OUTPATIENT)
Age: 69
End: 2024-09-19

## 2024-09-19 ENCOUNTER — APPOINTMENT (OUTPATIENT)
Dept: FAMILY MEDICINE | Facility: CLINIC | Age: 69
End: 2024-09-19
Payer: MEDICARE

## 2024-09-19 ENCOUNTER — APPOINTMENT (OUTPATIENT)
Dept: ORTHOPEDIC SURGERY | Facility: CLINIC | Age: 69
End: 2024-09-19

## 2024-09-19 VITALS
BODY MASS INDEX: 30.37 KG/M2 | OXYGEN SATURATION: 94 % | WEIGHT: 189 LBS | TEMPERATURE: 97.5 F | SYSTOLIC BLOOD PRESSURE: 128 MMHG | HEIGHT: 66 IN | HEART RATE: 70 BPM | DIASTOLIC BLOOD PRESSURE: 74 MMHG

## 2024-09-19 VITALS
HEIGHT: 66 IN | DIASTOLIC BLOOD PRESSURE: 77 MMHG | WEIGHT: 189 LBS | SYSTOLIC BLOOD PRESSURE: 118 MMHG | HEART RATE: 81 BPM | BODY MASS INDEX: 30.37 KG/M2

## 2024-09-19 DIAGNOSIS — S83.422A SPRAIN OF LATERAL COLLATERAL LIGAMENT OF LEFT KNEE, INITIAL ENCOUNTER: ICD-10-CM

## 2024-09-19 DIAGNOSIS — S99.929S UNSPECIFIED INJURY OF UNSPECIFIED FOOT, SEQUELA: Chronic | ICD-10-CM

## 2024-09-19 DIAGNOSIS — M17.11 UNILATERAL PRIMARY OSTEOARTHRITIS, RIGHT KNEE: ICD-10-CM

## 2024-09-19 DIAGNOSIS — M25.561 PAIN IN RIGHT KNEE: ICD-10-CM

## 2024-09-19 DIAGNOSIS — Z90.49 ACQUIRED ABSENCE OF OTHER SPECIFIED PARTS OF DIGESTIVE TRACT: Chronic | ICD-10-CM

## 2024-09-19 DIAGNOSIS — M25.562 PAIN IN RIGHT KNEE: ICD-10-CM

## 2024-09-19 DIAGNOSIS — M79.89 OTHER SPECIFIED SOFT TISSUE DISORDERS: ICD-10-CM

## 2024-09-19 DIAGNOSIS — I82.462 LT CALF MUSCULAR VEIN ACUTE EMBOLISM AND THROMBOSIS: ICD-10-CM

## 2024-09-19 DIAGNOSIS — M17.12 UNILATERAL PRIMARY OSTEOARTHRITIS, LEFT KNEE: ICD-10-CM

## 2024-09-19 PROCEDURE — 99214 OFFICE O/P EST MOD 30 MIN: CPT

## 2024-09-19 PROCEDURE — 99204 OFFICE O/P NEW MOD 45 MIN: CPT

## 2024-09-19 PROCEDURE — 93971 EXTREMITY STUDY: CPT | Mod: 26,LT

## 2024-09-19 PROCEDURE — 93971 EXTREMITY STUDY: CPT

## 2024-09-19 RX ORDER — RIVAROXABAN 20 MG/1
20 TABLET, FILM COATED ORAL
Qty: 90 | Refills: 0 | Status: ACTIVE | COMMUNITY
Start: 2024-09-19 | End: 1900-01-01

## 2024-09-19 RX ORDER — OXYCODONE AND ACETAMINOPHEN 5; 325 MG/1; MG/1
5-325 TABLET ORAL
Qty: 21 | Refills: 0 | Status: ACTIVE | COMMUNITY
Start: 2024-09-19 | End: 1900-01-01

## 2024-09-19 NOTE — HISTORY OF PRESENT ILLNESS
[FreeTextEntry8] : Pt c/o continued pain in calf after fall. Pt felt there was decreased blood flow into calf, Dr. Cheng ordered knee brace.

## 2024-09-19 NOTE — PHYSICAL EXAM
[Normal] : normal rate, regular rhythm, normal S1 and S2 and no murmur heard [de-identified] : L calf discomfort. b/l knee pain

## 2024-09-19 NOTE — ASSESSMENT
[FreeTextEntry1] : b/l knee pain, L calf DVT - samples of xarelto 15mg bid x 3 weeks. then start xarelto 20mg q day x 3 mos. f/u with vascular surgery. percocet prn pain. d/c mobic, fish oil, ASA as she will be on AC. . Possible adverse effects discussed with pt

## 2024-09-20 DIAGNOSIS — Z86.79 PERSONAL HISTORY OF OTHER DISEASES OF THE CIRCULATORY SYSTEM: ICD-10-CM

## 2024-09-20 DIAGNOSIS — Z86.39 PERSONAL HISTORY OF OTHER ENDOCRINE, NUTRITIONAL AND METABOLIC DISEASE: ICD-10-CM

## 2024-09-20 DIAGNOSIS — M19.049 PRIMARY OSTEOARTHRITIS, UNSPECIFIED HAND: ICD-10-CM

## 2024-09-20 DIAGNOSIS — Z87.39 PERSONAL HISTORY OF OTHER DISEASES OF THE MUSCULOSKELETAL SYSTEM AND CONNECTIVE TISSUE: ICD-10-CM

## 2024-09-23 NOTE — REVIEW OF SYSTEMS
[Joint Pain] : joint pain [Joint Swelling] : joint swelling [Sleep Disturbances] : ~T sleep disturbances [Negative] : Heme/Lymph

## 2024-09-24 NOTE — CONSULT LETTER
[Dear  ___] : Dear  [unfilled], [Consult Letter:] : I had the pleasure of evaluating your patient, [unfilled]. [Please see my note below.] : Please see my note below. [Consult Closing:] : Thank you very much for allowing me to participate in the care of this patient.  If you have any questions, please do not hesitate to contact me. [Sincerely,] : Sincerely, [FreeTextEntry3] : Isauro Cheng, III, MD [facility]

## 2024-09-24 NOTE — PHYSICAL EXAM
[de-identified] : Right Knee: Range of Motion in Degrees                                  Claimant:   Normal:  Flexion Active          120            135-degrees  Flexion Passive       120            135-degrees  Extension Active          0             0-5-degrees  Extension Passive       0             0-5-degrees    No weakness to flexion/extension. No evidence of instability in the AP plane or varus or valgus stress.  Negative Lachman.  Negative pivot shift.  Negative anterior drawer test.  Negative posterior drawer test.  Negative Renae.  Negative Apley grind.  No medial or lateral joint line tenderness.  Positive tenderness over the medial and lateral facet of the patella.  Positive patellofemoral crepitations.  No lateral tilting patella.  No patella apprehension.  Positive crepitation in the medial and lateral femoral condyle.  Mild-to-moderate effusion.  No gross motor or sensory deficits. 2+ DP and PT pulses. No varus or valgus malalignment.  Skin is intact.  No rashes, scars or lesions.   Left Knee: Range of Motion in Degrees	 	                         Claimant:	Normal:	 Flexion Active	         120                 135-degrees	 Flexion Passive	 120                 135-degrees	 Extension Active	     0	                0-5-degrees	 Extension Passive	     0	                0-5-degrees	  No weakness to flexion/extension.  No evidence of instability in the AP plane to valgus stress.  Positive tenderness over the lateral collateral ligament with a 1+ gap.  Negative Lachman.  Negative pivot shift.  Negative anterior drawer test.  Negative posterior drawer test.  Negative Renae.  Negative Apley grind.  No medial or lateral joint line tenderness.   Positive tenderness over the medial and lateral facet of the patella.  Positive patellofemoral crepitations.  No lateral tilting patella.  No patella apprehension.   Positive crepitation in the medial and lateral femoral condyle.  No proximal or distal swelling, edema or tenderness.  No gross motor or sensory deficits.   Positive crepitation in the medial and lateral femoral condyle.   Positive crepitation in the medial and lateral femoral condyle.  Mild intraarticular swelling.  2+ DP and PT pulses.  No varus or valgus malalignment.  Skin is intact.  No rashes, scars or lesions.     Left Calf:  Tenderness in the calf.   [de-identified] : Gait and Station:  Ambulating with a slightly antalgic to antalgic gait.  Normal Station.  [de-identified] : Appearance:  Well-developed, well-nourished female in no acute distress.

## 2024-09-24 NOTE — DISCUSSION/SUMMARY
[de-identified] : At this time, due to osteoarthritis of bilateral knees aggravated by a fall, and LCL of the left knee, I recommended a duplex sonogram to rule out a DVT.  I also recommended a Playmaker brace for the left knee, a compression sleeve for the right knee, ice, and topical anti-inflammatory.  She will be reassessed in 2 weeks.    The patient was prescribed a rigid support Playmaker knee brace with range of motion joints.  The brace will safely protect the patient and help to facilitate healing by stabilizing and controlling the knee.  In order to prevent skin breakdown along bony prominences and to avoid compression of the peroneal nerve, a custom fit is necessary.  ADDENDUM:  I spoke to the radiologist, which revealed that she does have a blood clot in the left gastrocnemius vein.  Left gastrocnemius vein DVT.  I called Dr. Mariano's office.  They are going to get in her ASAP.  The patient was aware.   Addendum dictated by Magalie Zepeda, BREA/CELE, PA

## 2024-09-24 NOTE — HISTORY OF PRESENT ILLNESS
[7] : a current pain level of 7/10 [de-identified] : The patient comes in today with complaints of pain to her bilateral knees.  She is status having a fall a week ago while in [place] on a boat.  She states she is having some persistent complaints of pain.  The patient states the onset/injury occurred on 09/10/24.  This injury is not work related or due to an automobile accident.  The patient states the pain is constant and radiating.  The patient describes the pain as sharp.  [de-identified] : Everything

## 2024-09-24 NOTE — DISCUSSION/SUMMARY
[de-identified] : At this time, due to osteoarthritis of bilateral knees aggravated by a fall, and LCL of the left knee, I recommended a duplex sonogram to rule out a DVT.  I also recommended a Playmaker brace for the left knee, a compression sleeve for the right knee, ice, and topical anti-inflammatory.  She will be reassessed in 2 weeks.    The patient was prescribed a rigid support Playmaker knee brace with range of motion joints.  The brace will safely protect the patient and help to facilitate healing by stabilizing and controlling the knee.  In order to prevent skin breakdown along bony prominences and to avoid compression of the peroneal nerve, a custom fit is necessary.  ADDENDUM:  I spoke to the radiologist, which revealed that she does have a blood clot in the left gastrocnemius vein.  Left gastrocnemius vein DVT.  I called Dr. Mariano's office.  They are going to get in her ASAP.  The patient was aware.   Addendum dictated by Magalie Zepeda, BREA/CELE, PA

## 2024-09-24 NOTE — ADDENDUM
[FreeTextEntry1] : This note was written by Rogerio Cooper on 09/23/2024, acting as a scribe for Isauro Cheng III, MD

## 2024-09-24 NOTE — PHYSICAL EXAM
[de-identified] : Right Knee: Range of Motion in Degrees                                  Claimant:   Normal:  Flexion Active          120            135-degrees  Flexion Passive       120            135-degrees  Extension Active          0             0-5-degrees  Extension Passive       0             0-5-degrees    No weakness to flexion/extension. No evidence of instability in the AP plane or varus or valgus stress.  Negative Lachman.  Negative pivot shift.  Negative anterior drawer test.  Negative posterior drawer test.  Negative Renae.  Negative Apley grind.  No medial or lateral joint line tenderness.  Positive tenderness over the medial and lateral facet of the patella.  Positive patellofemoral crepitations.  No lateral tilting patella.  No patella apprehension.  Positive crepitation in the medial and lateral femoral condyle.  Mild-to-moderate effusion.  No gross motor or sensory deficits. 2+ DP and PT pulses. No varus or valgus malalignment.  Skin is intact.  No rashes, scars or lesions.   Left Knee: Range of Motion in Degrees	 	                         Claimant:	Normal:	 Flexion Active	         120                 135-degrees	 Flexion Passive	 120                 135-degrees	 Extension Active	     0	                0-5-degrees	 Extension Passive	     0	                0-5-degrees	  No weakness to flexion/extension.  No evidence of instability in the AP plane to valgus stress.  Positive tenderness over the lateral collateral ligament with a 1+ gap.  Negative Lachman.  Negative pivot shift.  Negative anterior drawer test.  Negative posterior drawer test.  Negative Renae.  Negative Apley grind.  No medial or lateral joint line tenderness.   Positive tenderness over the medial and lateral facet of the patella.  Positive patellofemoral crepitations.  No lateral tilting patella.  No patella apprehension.   Positive crepitation in the medial and lateral femoral condyle.  No proximal or distal swelling, edema or tenderness.  No gross motor or sensory deficits.   Positive crepitation in the medial and lateral femoral condyle.   Positive crepitation in the medial and lateral femoral condyle.  Mild intraarticular swelling.  2+ DP and PT pulses.  No varus or valgus malalignment.  Skin is intact.  No rashes, scars or lesions.     Left Calf:  Tenderness in the calf.   [de-identified] : Gait and Station:  Ambulating with a slightly antalgic to antalgic gait.  Normal Station.  [de-identified] : Appearance:  Well-developed, well-nourished female in no acute distress.

## 2024-09-24 NOTE — HISTORY OF PRESENT ILLNESS
[7] : a current pain level of 7/10 [de-identified] : The patient comes in today with complaints of pain to her bilateral knees.  She is status having a fall a week ago while in [place] on a boat.  She states she is having some persistent complaints of pain.  The patient states the onset/injury occurred on 09/10/24.  This injury is not work related or due to an automobile accident.  The patient states the pain is constant and radiating.  The patient describes the pain as sharp.  [de-identified] : Everything

## 2024-09-30 ENCOUNTER — TRANSCRIPTION ENCOUNTER (OUTPATIENT)
Age: 69
End: 2024-09-30

## 2024-10-15 ENCOUNTER — RESULT REVIEW (OUTPATIENT)
Age: 69
End: 2024-10-15

## 2024-10-15 ENCOUNTER — OUTPATIENT (OUTPATIENT)
Dept: OUTPATIENT SERVICES | Facility: HOSPITAL | Age: 69
LOS: 1 days | End: 2024-10-15
Payer: MEDICARE

## 2024-10-15 ENCOUNTER — APPOINTMENT (OUTPATIENT)
Dept: MAMMOGRAPHY | Facility: CLINIC | Age: 69
End: 2024-10-15
Payer: MEDICARE

## 2024-10-15 DIAGNOSIS — S99.929S UNSPECIFIED INJURY OF UNSPECIFIED FOOT, SEQUELA: Chronic | ICD-10-CM

## 2024-10-15 DIAGNOSIS — Z00.00 ENCOUNTER FOR GENERAL ADULT MEDICAL EXAMINATION WITHOUT ABNORMAL FINDINGS: ICD-10-CM

## 2024-10-15 DIAGNOSIS — Z90.49 ACQUIRED ABSENCE OF OTHER SPECIFIED PARTS OF DIGESTIVE TRACT: Chronic | ICD-10-CM

## 2024-10-15 PROCEDURE — 77067 SCR MAMMO BI INCL CAD: CPT | Mod: 26

## 2024-10-15 PROCEDURE — 77063 BREAST TOMOSYNTHESIS BI: CPT | Mod: 26

## 2024-10-24 ENCOUNTER — APPOINTMENT (OUTPATIENT)
Dept: FAMILY MEDICINE | Facility: CLINIC | Age: 69
End: 2024-10-24
Payer: MEDICARE

## 2024-10-24 VITALS
TEMPERATURE: 97.9 F | HEART RATE: 101 BPM | SYSTOLIC BLOOD PRESSURE: 112 MMHG | DIASTOLIC BLOOD PRESSURE: 72 MMHG | OXYGEN SATURATION: 96 % | HEIGHT: 66 IN

## 2024-10-24 DIAGNOSIS — J01.10 ACUTE FRONTAL SINUSITIS, UNSPECIFIED: ICD-10-CM

## 2024-10-24 DIAGNOSIS — R05.9 COUGH, UNSPECIFIED: ICD-10-CM

## 2024-10-24 PROCEDURE — 99214 OFFICE O/P EST MOD 30 MIN: CPT

## 2024-10-24 RX ORDER — GUAIFENESIN AND CODEINE PHOSPHATE 10; 100 MG/5ML; MG/5ML
100-10 SOLUTION ORAL
Qty: 120 | Refills: 0 | Status: ACTIVE | COMMUNITY
Start: 2024-10-24 | End: 1900-01-01

## 2024-10-24 RX ORDER — BENZONATATE 200 MG/1
200 CAPSULE ORAL 3 TIMES DAILY
Qty: 30 | Refills: 0 | Status: ACTIVE | COMMUNITY
Start: 2024-10-24 | End: 1900-01-01

## 2024-10-24 RX ORDER — AMOXICILLIN AND CLAVULANATE POTASSIUM 875; 125 MG/1; MG/1
875-125 TABLET, COATED ORAL
Qty: 14 | Refills: 0 | Status: ACTIVE | COMMUNITY
Start: 2024-10-24 | End: 1900-01-01

## 2024-10-25 LAB
INFLUENZA A RESULT: NOT DETECTED
INFLUENZA B RESULT: NOT DETECTED
RESP SYN VIRUS RESULT: NOT DETECTED
SARS-COV-2 RESULT: NOT DETECTED

## 2024-11-05 ENCOUNTER — APPOINTMENT (OUTPATIENT)
Dept: VASCULAR SURGERY | Facility: CLINIC | Age: 69
End: 2024-11-05
Payer: MEDICARE

## 2024-11-05 ENCOUNTER — APPOINTMENT (OUTPATIENT)
Dept: VASCULAR SURGERY | Facility: CLINIC | Age: 69
End: 2024-11-05

## 2024-11-05 VITALS
WEIGHT: 185 LBS | HEIGHT: 66 IN | SYSTOLIC BLOOD PRESSURE: 100 MMHG | HEART RATE: 88 BPM | DIASTOLIC BLOOD PRESSURE: 66 MMHG | BODY MASS INDEX: 29.73 KG/M2 | OXYGEN SATURATION: 95 %

## 2024-11-05 DIAGNOSIS — I82.409 ACUTE EMBOLISM AND THROMBOSIS OF UNSPECIFIED DEEP VEINS OF UNSPECIFIED LOWER EXTREMITY: ICD-10-CM

## 2024-11-05 PROCEDURE — 99204 OFFICE O/P NEW MOD 45 MIN: CPT

## 2024-11-05 PROCEDURE — 93971 EXTREMITY STUDY: CPT

## 2024-11-05 RX ORDER — RIVAROXABAN 20 MG/1
20 TABLET, FILM COATED ORAL
Qty: 45 | Refills: 0 | Status: ACTIVE | COMMUNITY
Start: 2024-11-05 | End: 1900-01-01

## 2024-11-06 ENCOUNTER — RX RENEWAL (OUTPATIENT)
Age: 69
End: 2024-11-06

## 2024-11-14 ENCOUNTER — RX RENEWAL (OUTPATIENT)
Age: 69
End: 2024-11-14

## 2024-11-20 PROCEDURE — 77067 SCR MAMMO BI INCL CAD: CPT

## 2024-11-20 PROCEDURE — 77063 BREAST TOMOSYNTHESIS BI: CPT

## 2024-12-09 ENCOUNTER — APPOINTMENT (OUTPATIENT)
Dept: VASCULAR SURGERY | Facility: CLINIC | Age: 69
End: 2024-12-09
Payer: MEDICARE

## 2024-12-09 DIAGNOSIS — I82.462 LT CALF MUSCULAR VEIN ACUTE EMBOLISM AND THROMBOSIS: ICD-10-CM

## 2024-12-09 PROCEDURE — 93971 EXTREMITY STUDY: CPT | Mod: LT

## 2024-12-09 PROCEDURE — 99213 OFFICE O/P EST LOW 20 MIN: CPT

## 2024-12-10 ENCOUNTER — RX RENEWAL (OUTPATIENT)
Age: 69
End: 2024-12-10

## 2024-12-18 ENCOUNTER — RX RENEWAL (OUTPATIENT)
Age: 69
End: 2024-12-18

## 2025-01-08 ENCOUNTER — RX RENEWAL (OUTPATIENT)
Age: 70
End: 2025-01-08

## 2025-01-15 ENCOUNTER — RX RENEWAL (OUTPATIENT)
Age: 70
End: 2025-01-15

## 2025-01-23 ENCOUNTER — RX RENEWAL (OUTPATIENT)
Age: 70
End: 2025-01-23

## 2025-03-04 ENCOUNTER — RX RENEWAL (OUTPATIENT)
Age: 70
End: 2025-03-04

## 2025-04-05 ENCOUNTER — RX RENEWAL (OUTPATIENT)
Age: 70
End: 2025-04-05

## 2025-04-30 ENCOUNTER — RX RENEWAL (OUTPATIENT)
Age: 70
End: 2025-04-30

## 2025-05-05 ENCOUNTER — NON-APPOINTMENT (OUTPATIENT)
Age: 70
End: 2025-05-05

## 2025-05-07 ENCOUNTER — RESULT REVIEW (OUTPATIENT)
Age: 70
End: 2025-05-07

## 2025-05-07 ENCOUNTER — APPOINTMENT (OUTPATIENT)
Dept: CT IMAGING | Facility: CLINIC | Age: 70
End: 2025-05-07
Payer: MEDICARE

## 2025-05-07 ENCOUNTER — OUTPATIENT (OUTPATIENT)
Dept: OUTPATIENT SERVICES | Facility: HOSPITAL | Age: 70
LOS: 1 days | End: 2025-05-07
Payer: MEDICARE

## 2025-05-07 ENCOUNTER — APPOINTMENT (OUTPATIENT)
Dept: FAMILY MEDICINE | Facility: CLINIC | Age: 70
End: 2025-05-07
Payer: MEDICARE

## 2025-05-07 VITALS — HEART RATE: 88 BPM | RESPIRATION RATE: 16 BRPM | DIASTOLIC BLOOD PRESSURE: 80 MMHG | SYSTOLIC BLOOD PRESSURE: 112 MMHG

## 2025-05-07 VITALS
HEART RATE: 103 BPM | WEIGHT: 184 LBS | TEMPERATURE: 97.6 F | SYSTOLIC BLOOD PRESSURE: 110 MMHG | BODY MASS INDEX: 29.57 KG/M2 | HEIGHT: 66 IN | OXYGEN SATURATION: 96 % | DIASTOLIC BLOOD PRESSURE: 70 MMHG

## 2025-05-07 DIAGNOSIS — E03.9 HYPOTHYROIDISM, UNSPECIFIED: ICD-10-CM

## 2025-05-07 DIAGNOSIS — S99.929S UNSPECIFIED INJURY OF UNSPECIFIED FOOT, SEQUELA: Chronic | ICD-10-CM

## 2025-05-07 DIAGNOSIS — E78.5 HYPERLIPIDEMIA, UNSPECIFIED: ICD-10-CM

## 2025-05-07 DIAGNOSIS — E11.9 TYPE 2 DIABETES MELLITUS WITHOUT COMPLICATIONS: ICD-10-CM

## 2025-05-07 DIAGNOSIS — K57.92 DIVERTICULITIS OF INTESTINE, PART UNSPECIFIED, W/OUT PERFORATION OR ABSCESS W/OUT BLEEDING: ICD-10-CM

## 2025-05-07 DIAGNOSIS — K57.92 DIVERTICULITIS OF INTESTINE, PART UNSPECIFIED, WITHOUT PERFORATION OR ABSCESS WITHOUT BLEEDING: ICD-10-CM

## 2025-05-07 LAB
BILIRUB UR QL STRIP: NORMAL
CLARITY UR: NORMAL
COLLECTION METHOD: NORMAL
GLUCOSE UR-MCNC: NEGATIVE
HCG UR QL: 0.2 EU/DL
HGB UR QL STRIP.AUTO: NORMAL
KETONES UR-MCNC: NORMAL
LEUKOCYTE ESTERASE UR QL STRIP: NORMAL
NITRITE UR QL STRIP: NEGATIVE
PH UR STRIP: 6
PROT UR STRIP-MCNC: 300
SP GR UR STRIP: 1.02

## 2025-05-07 PROCEDURE — 81003 URINALYSIS AUTO W/O SCOPE: CPT | Mod: QW

## 2025-05-07 PROCEDURE — G2211 COMPLEX E/M VISIT ADD ON: CPT

## 2025-05-07 PROCEDURE — 74176 CT ABD & PELVIS W/O CONTRAST: CPT

## 2025-05-07 PROCEDURE — 74176 CT ABD & PELVIS W/O CONTRAST: CPT | Mod: 26

## 2025-05-07 PROCEDURE — 99214 OFFICE O/P EST MOD 30 MIN: CPT

## 2025-05-07 RX ORDER — CIPROFLOXACIN HYDROCHLORIDE 500 MG/1
500 TABLET, FILM COATED ORAL TWICE DAILY
Qty: 20 | Refills: 0 | Status: ACTIVE | COMMUNITY
Start: 2025-05-07 | End: 1900-01-01

## 2025-05-07 RX ORDER — METRONIDAZOLE 375 MG/1
375 CAPSULE ORAL
Qty: 20 | Refills: 0 | Status: ACTIVE | COMMUNITY
Start: 2025-05-07 | End: 1900-01-01

## 2025-05-08 LAB
ALBUMIN SERPL ELPH-MCNC: 4.1 G/DL
ALP BLD-CCNC: 106 U/L
ALT SERPL-CCNC: 25 U/L
ANION GAP SERPL CALC-SCNC: 18 MMOL/L
APPEARANCE: ABNORMAL
AST SERPL-CCNC: 25 U/L
BACTERIA: ABNORMAL /HPF
BASOPHILS # BLD AUTO: 0.03 K/UL
BASOPHILS NFR BLD AUTO: 0.3 %
BILIRUB SERPL-MCNC: 1.1 MG/DL
BILIRUBIN URINE: NEGATIVE
BLOOD URINE: ABNORMAL
BUN SERPL-MCNC: 10 MG/DL
CALCIUM SERPL-MCNC: 9.4 MG/DL
CAST: NORMAL /LPF
CHLORIDE SERPL-SCNC: 96 MMOL/L
CHOLEST SERPL-MCNC: 199 MG/DL
CO2 SERPL-SCNC: 24 MMOL/L
COLOR: NORMAL
CREAT SERPL-MCNC: 0.97 MG/DL
CREAT SPEC-SCNC: 277 MG/DL
EGFRCR SERPLBLD CKD-EPI 2021: 64 ML/MIN/1.73M2
EOSINOPHIL # BLD AUTO: 0.01 K/UL
EOSINOPHIL NFR BLD AUTO: 0.1 %
EPITHELIAL CELLS: 2 /HPF
ESTIMATED AVERAGE GLUCOSE: 123 MG/DL
GLUCOSE QUALITATIVE U: NEGATIVE MG/DL
GLUCOSE SERPL-MCNC: 106 MG/DL
HBA1C MFR BLD HPLC: 5.9 %
HCT VFR BLD CALC: 43.7 %
HDLC SERPL-MCNC: 62 MG/DL
HGB BLD-MCNC: 14.4 G/DL
IMM GRANULOCYTES NFR BLD AUTO: 0.4 %
KETONES URINE: ABNORMAL MG/DL
LDLC SERPL-MCNC: 113 MG/DL
LEUKOCYTE ESTERASE URINE: ABNORMAL
LYMPHOCYTES # BLD AUTO: 1.02 K/UL
LYMPHOCYTES NFR BLD AUTO: 11.3 %
MAN DIFF?: NORMAL
MCHC RBC-ENTMCNC: 31.2 PG
MCHC RBC-ENTMCNC: 33 G/DL
MCV RBC AUTO: 94.8 FL
MICROALBUMIN 24H UR DL<=1MG/L-MCNC: 37 MG/DL
MICROALBUMIN/CREAT 24H UR-RTO: 134 MG/G
MICROSCOPIC-UA: NORMAL
MONOCYTES # BLD AUTO: 0.8 K/UL
MONOCYTES NFR BLD AUTO: 8.8 %
NEUTROPHILS # BLD AUTO: 7.14 K/UL
NEUTROPHILS NFR BLD AUTO: 79.1 %
NITRITE URINE: NEGATIVE
NONHDLC SERPL-MCNC: 138 MG/DL
PH URINE: 6
PLATELET # BLD AUTO: 225 K/UL
POTASSIUM SERPL-SCNC: 3.8 MMOL/L
PROT SERPL-MCNC: 6.9 G/DL
PROTEIN URINE: 100 MG/DL
RBC # BLD: 4.61 M/UL
RBC # FLD: 12.9 %
RED BLOOD CELLS URINE: 4 /HPF
REVIEW: NORMAL
SODIUM SERPL-SCNC: 137 MMOL/L
SPECIFIC GRAVITY URINE: 1.02
T4 SERPL-MCNC: 10.2 UG/DL
TRIGL SERPL-MCNC: 140 MG/DL
TSH SERPL-ACNC: 1.05 UIU/ML
URATE SERPL-MCNC: 4.2 MG/DL
UROBILINOGEN URINE: 1 MG/DL
WBC # FLD AUTO: 9.04 K/UL
WHITE BLOOD CELLS URINE: 263 /HPF

## 2025-05-11 LAB — BACTERIA UR CULT: ABNORMAL

## 2025-05-12 ENCOUNTER — TRANSCRIPTION ENCOUNTER (OUTPATIENT)
Age: 70
End: 2025-05-12

## 2025-05-15 ENCOUNTER — APPOINTMENT (OUTPATIENT)
Dept: FAMILY MEDICINE | Facility: CLINIC | Age: 70
End: 2025-05-15
Payer: MEDICARE

## 2025-05-15 VITALS
HEIGHT: 66 IN | WEIGHT: 180 LBS | DIASTOLIC BLOOD PRESSURE: 78 MMHG | TEMPERATURE: 97.6 F | OXYGEN SATURATION: 98 % | SYSTOLIC BLOOD PRESSURE: 122 MMHG | HEART RATE: 80 BPM | BODY MASS INDEX: 28.93 KG/M2

## 2025-05-15 VITALS — RESPIRATION RATE: 16 BRPM | HEART RATE: 76 BPM | DIASTOLIC BLOOD PRESSURE: 80 MMHG | SYSTOLIC BLOOD PRESSURE: 126 MMHG

## 2025-05-15 DIAGNOSIS — E03.9 HYPOTHYROIDISM, UNSPECIFIED: ICD-10-CM

## 2025-05-15 DIAGNOSIS — E78.5 HYPERLIPIDEMIA, UNSPECIFIED: ICD-10-CM

## 2025-05-15 DIAGNOSIS — I10 ESSENTIAL (PRIMARY) HYPERTENSION: ICD-10-CM

## 2025-05-15 DIAGNOSIS — E11.9 TYPE 2 DIABETES MELLITUS W/OUT COMPLICATIONS: ICD-10-CM

## 2025-05-15 PROCEDURE — G2211 COMPLEX E/M VISIT ADD ON: CPT

## 2025-05-15 PROCEDURE — 99214 OFFICE O/P EST MOD 30 MIN: CPT

## 2025-05-15 RX ORDER — TIRZEPATIDE 7.5 MG/.5ML
7.5 INJECTION, SOLUTION SUBCUTANEOUS
Qty: 1 | Refills: 1 | Status: ACTIVE | COMMUNITY
Start: 2025-05-07 | End: 1900-01-01

## 2025-06-02 ENCOUNTER — RX RENEWAL (OUTPATIENT)
Age: 70
End: 2025-06-02

## 2025-07-10 ENCOUNTER — RX RENEWAL (OUTPATIENT)
Age: 70
End: 2025-07-10